# Patient Record
Sex: MALE | Race: WHITE | NOT HISPANIC OR LATINO | Employment: OTHER | URBAN - METROPOLITAN AREA
[De-identification: names, ages, dates, MRNs, and addresses within clinical notes are randomized per-mention and may not be internally consistent; named-entity substitution may affect disease eponyms.]

---

## 2019-03-03 ENCOUNTER — HOSPITAL ENCOUNTER (OUTPATIENT)
Dept: ADMINISTRATIVE | Facility: HOSPITAL | Age: 64
End: 2019-03-04
Attending: INTERNAL MEDICINE | Admitting: INTERNAL MEDICINE

## 2019-03-03 LAB
ABS NEUT (OLG): 6.42 X10(3)/MCL (ref 2.1–9.2)
ALBUMIN SERPL-MCNC: 3.5 GM/DL (ref 3.4–5)
ALBUMIN/GLOB SERPL: 0.9 {RATIO}
ALP SERPL-CCNC: 117 UNIT/L (ref 45–117)
ALT SERPL-CCNC: 18 UNIT/L (ref 16–61)
AST SERPL-CCNC: 21 UNIT/L (ref 15–37)
BASOPHILS # BLD AUTO: 0.03 X10(3)/MCL (ref 0–0.2)
BASOPHILS NFR BLD AUTO: 0.3 % (ref 0–0.9)
BILIRUB SERPL-MCNC: 0.3 MG/DL (ref 0.2–1)
BILIRUBIN DIRECT+TOT PNL SERPL-MCNC: <0.1 MG/DL (ref 0–0.2)
BILIRUBIN DIRECT+TOT PNL SERPL-MCNC: >0.2 MG/DL (ref 0–1)
BUN SERPL-MCNC: 10 MG/DL (ref 7–18)
CALCIUM SERPL-MCNC: 7.9 MG/DL (ref 8.5–10.1)
CHLORIDE SERPL-SCNC: 104 MMOL/L (ref 98–107)
CK MB SERPL-MCNC: 3.3 NG/ML (ref 0.5–3.6)
CO2 SERPL-SCNC: 24 MMOL/L (ref 21–32)
CREAT SERPL-MCNC: 0.93 MG/DL (ref 0.7–1.3)
D DIMER PPP IA.FEU-MCNC: <270 NG/ML FEU (ref 0–500)
EOSINOPHIL # BLD AUTO: 0.17 X10(3)/MCL (ref 0–0.9)
EOSINOPHIL NFR BLD AUTO: 1.9 % (ref 0–6.5)
ERYTHROCYTE [DISTWIDTH] IN BLOOD BY AUTOMATED COUNT: 13.2 % (ref 11.5–17)
GLOBULIN SER-MCNC: 4 GM/DL (ref 2–4)
GLUCOSE SERPL-MCNC: 328 MG/DL (ref 74–106)
HCT VFR BLD AUTO: 41.5 % (ref 42–52)
HGB BLD-MCNC: 13.8 GM/DL (ref 14–18)
IMM GRANULOCYTES # BLD AUTO: 0.04 10*3/UL (ref 0–0.02)
IMM GRANULOCYTES NFR BLD AUTO: 0.4 % (ref 0–0.43)
LYMPHOCYTES # BLD AUTO: 1.83 X10(3)/MCL (ref 0.6–4.6)
LYMPHOCYTES NFR BLD AUTO: 20.4 % (ref 16.2–38.3)
MCH RBC QN AUTO: 28.8 PG (ref 27–31)
MCHC RBC AUTO-ENTMCNC: 33.3 GM/DL (ref 33–36)
MCV RBC AUTO: 86.6 FL (ref 80–94)
MONOCYTES # BLD AUTO: 0.49 X10(3)/MCL (ref 0.1–1.3)
MONOCYTES NFR BLD AUTO: 5.5 % (ref 4.7–11.3)
NEUTROPHILS # BLD AUTO: 6.42 X10(3)/MCL (ref 2.1–9.2)
NEUTROPHILS NFR BLD AUTO: 71.5 % (ref 49.1–73.4)
NRBC BLD AUTO-RTO: 0 % (ref 0–0.2)
PLATELET # BLD AUTO: 212 X10(3)/MCL (ref 130–400)
PMV BLD AUTO: 10.8 FL (ref 7.4–10.4)
POTASSIUM SERPL-SCNC: 4.3 MMOL/L (ref 3.5–5.1)
PROT SERPL-MCNC: 7.4 GM/DL (ref 6.4–8.2)
RBC # BLD AUTO: 4.79 X10(6)/MCL (ref 4.7–6.1)
SODIUM SERPL-SCNC: 134 MMOL/L (ref 136–145)
TROPONIN I SERPL-MCNC: 0.03 NG/ML (ref 0–0.04)
TROPONIN I SERPL-MCNC: 0.08 NG/ML (ref 0.02–0.49)
WBC # SPEC AUTO: 9 X10(3)/MCL (ref 4.5–11.5)

## 2019-03-04 LAB
CK MB SERPL-MCNC: 2.5 NG/ML (ref 0.5–3.6)
CK MB SERPL-MCNC: 3 NG/ML (ref 0.5–3.6)
TROPONIN I SERPL-MCNC: 0.08 NG/ML (ref 0.02–0.49)
TROPONIN I SERPL-MCNC: 0.08 NG/ML (ref 0.02–0.49)

## 2019-08-09 ENCOUNTER — HISTORICAL (OUTPATIENT)
Dept: CARDIOLOGY | Facility: HOSPITAL | Age: 64
End: 2019-08-09

## 2019-11-18 ENCOUNTER — HISTORICAL (OUTPATIENT)
Dept: ADMINISTRATIVE | Facility: HOSPITAL | Age: 64
End: 2019-11-18

## 2021-01-15 ENCOUNTER — HISTORICAL (OUTPATIENT)
Dept: LAB | Facility: HOSPITAL | Age: 66
End: 2021-01-15

## 2021-01-15 LAB
ABS NEUT (OLG): 7.01 X10(3)/MCL (ref 2.1–9.2)
BASOPHILS # BLD AUTO: 0.03 X10(3)/MCL (ref 0–0.2)
BASOPHILS NFR BLD AUTO: 0.3 % (ref 0–0.9)
EOSINOPHIL # BLD AUTO: 0.2 X10(3)/MCL (ref 0–0.9)
EOSINOPHIL NFR BLD AUTO: 2 % (ref 0–6.5)
ERYTHROCYTE [DISTWIDTH] IN BLOOD BY AUTOMATED COUNT: 15.1 % (ref 11.5–17)
HCT VFR BLD AUTO: 38.9 % (ref 42–52)
HGB BLD-MCNC: 12.3 GM/DL (ref 14–18)
IMM GRANULOCYTES # BLD AUTO: 0.05 10*3/UL (ref 0–0.02)
IMM GRANULOCYTES NFR BLD AUTO: 0.5 % (ref 0–0.43)
LYMPHOCYTES # BLD AUTO: 2.14 X10(3)/MCL (ref 0.6–4.6)
LYMPHOCYTES NFR BLD AUTO: 21.2 % (ref 16.2–38.3)
MCH RBC QN AUTO: 28.4 PG (ref 27–31)
MCHC RBC AUTO-ENTMCNC: 31.6 GM/DL (ref 33–36)
MCV RBC AUTO: 89.8 FL (ref 80–94)
MONOCYTES # BLD AUTO: 0.67 X10(3)/MCL (ref 0.1–1.3)
MONOCYTES NFR BLD AUTO: 6.6 % (ref 4.7–11.3)
NEUTROPHILS # BLD AUTO: 7.01 X10(3)/MCL (ref 2.1–9.2)
NEUTROPHILS NFR BLD AUTO: 69.4 % (ref 49.1–73.4)
NRBC BLD AUTO-RTO: 0 % (ref 0–0.2)
PLATELET # BLD AUTO: 245 X10(3)/MCL (ref 130–400)
PMV BLD AUTO: 10.8 FL (ref 7.4–10.4)
RBC # BLD AUTO: 4.33 X10(6)/MCL (ref 4.7–6.1)
WBC # SPEC AUTO: 10.1 X10(3)/MCL (ref 4.5–11.5)

## 2021-01-19 ENCOUNTER — HISTORICAL (OUTPATIENT)
Dept: ENDOSCOPY | Facility: HOSPITAL | Age: 66
End: 2021-01-19

## 2022-04-07 ENCOUNTER — HISTORICAL (OUTPATIENT)
Dept: ADMINISTRATIVE | Facility: HOSPITAL | Age: 67
End: 2022-04-07

## 2022-04-24 VITALS — SYSTOLIC BLOOD PRESSURE: 122 MMHG | DIASTOLIC BLOOD PRESSURE: 66 MMHG

## 2022-04-28 NOTE — ED PROVIDER NOTES
Patient:   Terrence Woods            MRN: [id number]            FIN: [id number]               Age:   63 years     Sex:  Male     :  1955   Associated Diagnoses:   Acute chest pain; New onset atrial flutter; Elevated troponin   Author:   Anayeli Hadley MD      Basic Information   History source: Patient.   Arrival mode: Private vehicle.   History limitation: None.      History of Present Illness   The patient presents with chest pain and Mr. Woods is a 63-year-old obese male with a history of hypertension, diabetes, who states he developed chest pain last night to the left chest radiating to his left shoulder and left upper back, exacerbated by lying flat, improved by sitting up and being still.  He has no shortness of breath, diaphoresis, palpitations.  He states he had an angiogram at our [facility] 3 years ago and was told he did not have blockages.  He has no history of irregular heart rhythm and is not on blood thinners..  The onset was 1  days ago.  The course/duration of symptoms is fluctuating in intensity.  Location: Left substernal chest. Radiating pain: left shoulder.  left side of the back.  upper back. The character of symptoms is achy.  The degree at onset was moderate.  The degree at maximum was moderate.  The degree at present is none.  There are exacerbating factors including exertion, movement and breathing.  The relieving factor is rest.  Risk factors consist of coronary artery disease, hypertension and diabetes mellitus.  Prior episodes: cardiac.  Therapy today None.  Associated symptoms: denies shortness of breath, denies nausea, denies vomiting and denies diaphoresis.        Review of Systems   Cardiovascular symptoms:  Chest pain.             Additional review of systems information: All other systems reviewed and otherwise negative.      Health Status   Allergies:    Allergic Reactions (Selected)  No Known Allergies,    Allergies (1) Active Reaction  No Known  Allergies None Documented.   Medications:  (Selected)   Prescriptions  Prescribed  Actos 30 mg oral tablet: 30 mg = 1 tab(s), Oral, Daily, # 30 tab(s), 3 Refill(s), Pharmacy: [facility]/pharmacy #[id number]  Ranexa 500mg Tab extended release: 500 mg = 1 tab(s), Oral, BID, # 60 tab(s), 6 Refill(s), [id number]  esomeprazole 40 mg oral DR capsule (pt. own): 40 mg = 1 cap(s), Oral, Daily, # 30 cap(s), 3 Refill(s), Pharmacy: [facility]/pharmacy #[id number]  gabapentin 300 mg oral capsule: 300 mg = 1 cap(s), Oral, Once a day (at bedtime), # 30 cap(s), 3 Refill(s), Pharmacy: [facility]/pharmacy #[id number]  glimepiride 4 mg oral tablet: See Instructions, TAKE 1 TABLET BY MOUTH EVERY DAY, # 30 tab(s), 6 Refill(s), [id number]  hydrochlorothiazide 25 mg oral tablet: 25 mg = 1 tab(s), Oral, Daily, # 30 tab(s), 6 Refill(s), Pharmacy: [facility]/pharmacy #[id number]  metformin 500 mg oral tablet: See Instructions, TAKE 2 TABLETS BY MOUTH TWICE A DAY, # 120 tab(s), 6 Refill(s), Pharmacy: [facility]/pharmacy #[id number]  metoprolol tartrate 25 mg oral tablet: 25 mg = 1 tab(s), Oral, BID, # 60 tab(s), 6 Refill(s), [id number].      Past Medical/ Family/ Social History   Medical history: Negative.   Surgical history:    Shoulder repair ([id number])., Reviewed as documented in chart.   Family history:    Diabetes mellitus type 2  Mother  Heart disease  Mother  Congestive heart disease.  Father  , Reviewed as documented in chart.   Social history:    Social & Psychosocial Habits    Alcohol  04/25/2016  Use: Past    Employment/School  04/25/2016  Status: Unemployed    Home/Environment  04/25/2016  Lives with: Children    Nutrition/Health  04/25/2016  Type of diet: Calorie restricted    Substance Abuse  04/25/2016  Use: Never    Tobacco  03/03/2019  Use: Never (less than 100 in l    Type: Cigarettes    Patient Wants Consult For Cessation Counseling N/A    Ready to change: No, Tobacco use: Regularly.   Problem list:    Active Problems (2)  DM  (diabetes mellitus)   Tobacco user   HTN  CAD, no stents, per nurse's notes.      Physical Examination               Vital Signs      Vital Signs (last 24 hrs)_____  Last Charted___________  Temp Oral     37 DegC  (MAR 03 09:53)  Heart Rate Peripheral   H 109bpm  (MAR 03 09:53)  Resp Rate         18 br/min  (MAR 03 09:53)  SBP      H 175mmHg  (MAR 03 11:33)  DBP      H 94mmHg  (MAR 03 11:33)  SpO2      99 %  (MAR 03 09:53).   Oxygen saturation.   General:  Alert, no acute distress.    Skin:  Warm, dry, pink, intact.    Eye:  Pupils are equal, round and reactive to light.   Neck:  Supple, no JVD.    Cardiovascular:  Regular rate and rhythm.   Respiratory:  Lungs are clear to auscultation, respirations are non-labored.    Gastrointestinal:  Soft, Nontender.    Musculoskeletal:  Normal ROM, normal strength, no swelling, no deformity, No sign of DVT, 1+ pitting edema to lower extremities.    Neurological:  Alert and oriented to person, place, time, and situation.   Psychiatric:  Cooperative.      Medical Decision Making   Differential Diagnosis:  Unstable angina, anxiety, pulmonary embolism, Arrhythmia.    Documents reviewed:  Emergency department nurses' notes, On chart review and noted that he had a cardiac angiogram performed by Dr. Bhandari on July 1, 2016 which showed a 30-40% stenosis of the LAD and 30-40% stenosis of the right coronary.    Orders  Launch Orders   Laboratory:  D-Dimer (Order): Stat collect, 3/3/2019 9:58 CST, Blood, Lab Collect, 3/3/2019 9:58 CST  BNP-Pro (Order): Stat collect, 3/3/2019 9:58 CST, Blood, Lab Collect, 3/3/2019 9:58 CST  Troponin-I (Order): Stat collect, 3/3/2019 9:58 CST, Blood, Lab Collect, 3/3/2019 9:58 CST  CMP (Order): Stat collect, 3/3/2019 9:58 CST, Blood, Lab Collect, 3/3/2019 9:58 CST  CBC w/ Auto Diff (Order): Now collect, 3/3/2019 9:58 CST, Blood, Lab Collect, 3/3/2019 9:58 CST  Patient Care:  Saline Lock Insert (Order): 3/3/2019 9:58 CST  Cardiac Monitoring (Order):  3/3/2019 9:58 CST, Constant Order  Radiology:  XR Chest 1 View (Order): Stat, 3/3/2019 9:58 CST, Chest Pain, None, Patient Bed, Patient Has IV?, Rad Type, Not Scheduled  Cardiology:  EKG (Order): 3/3/2019 9:58 CST, Stat, Once, 3/3/2019 9:58 CST, Patient Bed, Patient Has IV, Standard Precautions, -1, -1, 3/3/2019 9:58 CST.   Electrocardiogram:  Time 3/3/2019 09:53:00, rate 108, A flutter, low voltage QRS, nonspecific ST changes, normal T waves, abnormal EKG.    Results review:  Lab results : Lab View   3/3/2019 11:23 CST       WBC                       9.0 x10(3)/mcL                             RBC                       4.79 x10(6)/mcL                             Hgb                       13.8 gm/dL  LOW                             Hct                       41.5 %  LOW                             Platelet                  212 x10(3)/mcL                             MCV                       86.6 fL                             MCH                       28.8 pg                             MCHC                      33.3 gm/dL                             RDW                       13.2 %                             MPV                       10.8 fL  HI                             Abs Neut                  6.42 x10(3)/mcL                             Neutro Auto               71.5 %                             Lymph Auto                20.4 %                             Mono Auto                 5.5 %                             Eos Auto                  1.9 %                             Abs Eos                   0.17 x10(3)/mcL                             Basophil Auto             0.3 %                             Abs Neutro                6.42 x10(3)/mcL                             Abs Lymph                 1.83 x10(3)/mcL                             Abs Mono                  0.49 x10(3)/mcL                             Abs Baso                  0.03 x10(3)/mcL                             NRBC%                     0.0 %                              IG%                       0.400 %                             IG#                       0.0400  HI    3/3/2019 10:20 CST       Sodium Lvl                134 mmol/L  LOW                             Potassium Lvl             4.3 mmol/L                             Chloride                  104 mmol/L                             CO2                       24.0 mmol/L                             Calcium Lvl               7.9 mg/dL  LOW                             Glucose Lvl               328 mg/dL  HI                             BUN                       10.0 mg/dL                             Creatinine                0.93 mg/dL                             eGFR-AA                   >60 mL/min/1.73 m2  NA                             eGFR-TAWANNA                  >60 mL/min/1.73 m2  NA                             Bili Total                0.3 mg/dL                             Bili Direct               <0.10 mg/dL                             Bili Indirect             >0.20 mg/dL                             AST                       21 unit/L                             ALT                       18 unit/L                             Alk Phos                  117 unit/L                             Total Protein             7.4 gm/dL                             Albumin Lvl               3.5 gm/dL                             Globulin                  4 gm/dL                             A/G Ratio                 0.9  NA                             NT pro BNP.               402 pg/mL  HI                             Troponin-I                0.031 ng/mL  ,    No qualifying data available.    Radiology results:  Rad Results (ST)  < 12 hrs   Accession: [id number]  Order: XR Chest 1 View  Report Dt/Tm: 03/03/2019 11:17  Report:   one view of the chest     CPT 93679     HISTORY:  Chest Pain     FINDINGS:  Examination reveals mediastinal and cardiac silhouettes to be within  normal limits. Lung fields are clear  and free of gross infiltrates  atelectases or effusions     IMPRESSION: No active pulmonary disease    .      Reexamination/ Reevaluation   Time: 3/3/2019 11:48:00 .   Notes: No chest pain at this time, vital signs stable.      Impression and Plan   Diagnosis   Acute chest pain (POJ73-TT R07.9)   New onset atrial flutter (CPG82-KD I48.92)   Elevated troponin (UEX94-VR R74.8)      Calls-Consults   -  3/3/2019 12:01:00 , Isaiah COLON, Ron RM, Cardiology, Case discussed with Ronny nurse practitioner.  Atrial flutter is known although the the patient denied ever having an abnormal heart rhythm.  Patient denied ever being on blood thinners, but according to Ronny's records he was previously on Eliquis for this problem.  Troponin is mildly elevated, BNP mildly elevated.  Patient has chest pain with known CAD so we will place him in the hospital for observation and further evaluation..    Plan   Condition: Stable.    Disposition: Admit time  3/3/2019 12:03:00, Place in Observation Telemetry Unit.    Counseled: Patient, Regarding diagnosis, Regarding diagnostic results, Regarding treatment plan, Patient indicated understanding of instructions, Patient is agreeable to hospital admission.

## 2022-04-28 NOTE — CONSULTS
DATE OF CONSULTATION:  08/09/2019    ATTENDING PHYSICIAN:  Frank Bean MD  CONSULTING PHYSICIAN:  Clyde Quezada IV, MD    CHIEF COMPLAINT:  Chest pain.    HISTORY OF PRESENT ILLNESS:  The patient is a 63-year-old male with a long history of diabetes who has been having some dyspnea on exertion and chest pain over the last month or so.  He also has a history of atrial fibrillation.  He was admitted for elective left heart cath which revealed severe multivessel coronary artery disease, including an occluded LAD with some filling retrograde disease of the ramus, OM, and the posterior descending artery.  Has good LV function by echo.  Interestingly, his cath 3 years ago revealed only mild disease but the patient lost his insurance at that time and was not able to afford his diabetes medicines and undoubtedly his diabetes has been out of control for that period.    PAST MEDICAL HISTORY:  Significant for diabetes mellitus, A-flutter, arthralgia of the back, obesity.    PAST SURGICAL HISTORY:  Significant for shoulder surgery, left heart cath.    FAMILY HISTORY:  Positive for coronary artery disease and diabetes.    SOCIAL HISTORY:  He quit smoking in 1979.    REVIEW OF SYSTEMS:  Negative for fever or chills.  Positive for bilateral feet pain likely secondary to diabetic neuropathy.    PHYSICAL EXAMINATION:  GENERAL:  He is morbidly obese.  He is in no acute distress.   HEENT:  Pupils are equal and reactive to light and accommodation.  He is normocephalic and atraumatic.   NECK:  There is no jugular venous distension.   LUNGS:  Clear to auscultation bilaterally.   HEART:  Regular rate and rhythm without murmurs or gallops.   ABDOMEN:  Soft, nontender, and obese.   EXTREMITIES:  No cyanosis, clubbing, or edema.    IMPRESSION:    1. Severe multivessel coronary artery disease with good left ventricular function.  2. Diabetes mellitus.  3. Atrial flutter.  4. Morbid obesity.  5. Diabetic neuropathy.    PLAN:  The  patient will clearly benefit from coronary artery bypass grafting of the left anterior descending artery, ramus, obtuse marginal, and posterior descending artery.  In addition, we would do an ablation and ligation of his left atrial appendage and given his morbid obesity, we will also plan on rigid sternal fixation.  I have discussed the risks, benefits, and alternatives in great detail with the patient and his brother.  They understand and we are going to plan for this coming Monday.     Thank you for allowing me to participate in his care.        ______________________________  Clyde Quezada IV, MD    VET/UB  DD:  08/09/2019  Time:  11:40AM  DT:  08/09/2019  Time:  11:51AM  Job #:  [id number]

## 2022-04-28 NOTE — H&P
Patient:   Terrence Woods            MRN: [id number]            FIN: [id number]               Age:   63 years     Sex:  Male     :  1955   Associated Diagnoses:   None   Author:   Jose C Holland MD      Basic Information   Source of history:  Self, Medical record.    Referral source:  Emergency department.    History limitation:  None.       Chief Complaint   Left-sided chest pain for 1 night      History of Present Illness   Mr. Woods is a 63-year-old  man with past medical history significant for HTN, A. fib/A flutter, diabetes mellitus, and osteoarthritis of the shoulders and back who presented to the [facility] ED on 3/3/2019 with complaints of left-sided chest pain for 1 night not relieved by ibuprofen.  He reports pain started at 11 PM the previous night, sharp pain, located just under the left shoulder, with radiation to the left upper arm and back, worsened by lying flat, and better with sitting up and being still.  He took 4 tablets of ibuprofen and went to sleep, did not relieve in the morning, took 4 ibuprofen, and reported to the ED.  The pain finally stopped after about 4 hours in ED.  He was given aspirin 325, metoprolol, and nitroglycerin in the ED.  He denies itchiness of breath, diaphoresis, or palpitations.  Patient has osteoarthritis with surgery in the left shoulder, but he reports the pain is different from his usual OA pain.  Patient has a history of A. fib/A flutter and was on Eliquis was in 2016, but discontinued due to inaffordability secondary to loss of insurance.  He is currently not on any anti-coagulation or antiplatelet.  He was a serious patient and was last seen in May 2016, but has not returned because he was doing well.  Cardiac stress test was abnormal in May 2016, which led to LHC in 2016 showing 30-40% stenosis in RCA, normal left main, LAD, and LCx.  EKG in the ED showing A flutter with .  Follow-up EKG showing similar findings - atrial flutter  with heart rate in the 100 - 110.  Troponin ×3 has been negative.  NT pro-.  CXR showed no acute findings.   on sliding scale insulin.  Last A1c 11.5 in 2016.  He does not take any heart medications at home.    Allergies  NKDA    Home medications  Simvastatin 5 mg daily  Metformin 1000 mg BID  Lantus 53 units nightly  Gabapentin 300 mg TID  Omeprazole 20 mg daily    Past Medical History  HTN, A. fib/A flutter, diabetes mellitus, and osteoarthritis of the shoulders and back w    Past Surgical History  Left shoulder surgery    Family Medical History  Mother with heart disease and diabetes mellitus  Father with congestive heart failure    Social History  Alcohol use - previous user, but denies current use  Tobacco use - previous user, but denies current use  Illicit Drug use - never user       Review of Systems   Constitutional:  No fever, No chills, No sweats.    Respiratory:  No shortness of breath, No cough, No wheezing.    Cardiovascular:  No palpitations, No bradycardia, No tachycardia, No peripheral edema     Chest pain: Anterior, just under the left shoulder.    Gastrointestinal:  No nausea, No vomiting, No diarrhea, No constipation, No abdominal pain.    Musculoskeletal:  Left shoulder pain.    Neurologic:  No numbness, No tingling, No headache.       Histories   Past Medical History:    No active or resolved past medical history items have been selected or recorded.      Physical Examination      Vital Signs (last 24 hrs)_____  Last Charted___________  Temp Oral     36.7 DegC  (MAR 04 11:00)  Heart Rate Peripheral   88 bpm  (MAR 04 11:00)  Resp Rate         18 br/min  (MAR 04 11:00)  SBP      115 mmHg  (MAR 04 11:00)  DBP      73 mmHg  (MAR 04 11:00)  SpO2      97 %  (MAR 04 11:00)  Weight      140 kg  (MAR 03 21:00)  Height      173 cm  (MAR 03 21:00)  BMI      46.78  (MAR 03 21:00)   General:  Alert and oriented, No acute distress.    Eye:  Pupils are equal, round and reactive to light,  Extraocular movements are intact.    HENT:  Normocephalic, Normal hearing.    Neck:  Supple, Non-tender.    Respiratory:  Lungs are clear to auscultation, Respirations are non-labored, Breath sounds are equal, Symmetrical chest wall expansion, No chest wall tenderness.    Cardiovascular:  Normal rate, Regular rhythm, No murmur, No gallop, Good pulses equal in all extremities, 2+/3+ bilateral lower extremity edema up to 3/4 tibia.    Gastrointestinal:  Soft, Non-tender, Normal bowel sounds, Obese abdomen.    Musculoskeletal:  No tenderness, No swelling, No deformity.    Integumentary:  Warm, Dry, Intact.    Neurologic:  Alert, Oriented, No focal deficits.    Psychiatric:  Cooperative, Appropriate mood & affect.       Impression and Plan   Atypical chest pain   Hypertension  Atrial flutter  Diabetes mellitus  Osteoarthritis of shoulders and back    Patient is a 63-year-old  man with PMH significant for HTN, Atrial flutter, Diabetes mellitus, and Osteoarthritis who presented to the ED with left-sided chest pain relieved with nitroglycerin.  Currently, chest pain is no longer present, but no tingling left shoulder pain.  Patient is moving around the room and ambulating with no complaints of chest pains, palpitations, or shortness of breath.  Cardiac monitor indicates atrial flutter with heart rate 88.  Patient is asymptomatic and is hemodynamic stable overnight as well as this morning.  Okay for discharge today with close follow-up outpatient with Dr. Stevens of [facility] within 1 weeks.  Plans for further ischemic evaluation and possible cardioversion for AFlutter.  Placed on Eliquis 5 mg BID.  Continuing ASA 81 mg daily and metroprolol tartrate 25 mg BID at home.  Added lisinopril 5 mg daily.

## 2022-04-28 NOTE — DISCHARGE SUMMARY
Patient:   Terrence Woods            MRN: [id number]            FIN: [id number]               Age:   63 years     Sex:  Male     :  1955   Associated Diagnoses:   None   Author:   Weston Alanis      Please see H&P on  3.4. as same day discharge summary

## 2022-04-28 NOTE — OP NOTE
Patient:   Terrence Woods            MRN: [id number]            FIN: [id number]               Age:   65 years     Sex:  Male     :  1955   Associated Diagnoses:   None   Author:   Hillary Vernon MD      Operative Note   Operative Information   Date/ Time:  2021 08:37:00.     Procedures Performed: Colonoscopy with biopsy.     Preoperative Diagnosis: Anemia, no previous colon screening.     Postoperative Diagnosis: 1.  Scattered diverticula, right and left colon.  2.  Prominent ileocecal valve.  Biopsies were obtained.  3.  Internal hemorrhoids, grade 1-2.  Otherwise normal exam.     Surgeon: Hillary Vernon MD.     Assistant: GI staff.     Anesthesia: per anaesthesia service.     Speciman Removed: Yes.     Esimated blood loss: loss  3  cc.     Description of Procedure/Findings/    Complications: History and physical as per pre-operative note. Informed consent was obtained. Patient was placed in left lateral position. Sedation per anaesthesia service. Rectal exam was unremarkable. Olympus video colonoscope was introduced into the rectum and was carefully advanced to the cecum. The quality of the preparation was fair.  Periodic irrigation was done for better visualization. Patient tolerated the procedure well without any complications..     Findings: There were scattered diverticula noted in right as well as left colon.  Photo documentation was obtained.  There was prominent inferior lip of the ileocecal valve noted.  This appeared to be lipomatous.  Biopsies were obtained.  Terminal ileum appeared unremarkable to the extent of exam.  Cecum was unremarkable.  Transverse colon was unremarkable.  No polyps were noted to the extent of visualization.  Periodic irrigation was done throughout the procedure for better visualization.  Rectal mucosa appeared unremarkable.  There were grade 1-2 internal hemorrhoids noted on withdrawal of the scope.  Estimated withdrawal time from cecum to rectum was 15 minutes and  1 seconds..     Complications: None.     Recommendations:  1.  Follow biopsy results.  2.  High-fiber diet.  3.  Patient to follow with me as scheduled..

## 2022-04-28 NOTE — OP NOTE
Patient:   Terrence Woods            MRN: [id number]            FIN: [id number]               Age:   65 years     Sex:  Male     :  1955   Associated Diagnoses:   None   Author:   Hillary Vernon MD      Operative Note   Operative Information   Date/ Time:  2021 07:52:00.     Procedures Performed: EGD, diagnostic.     Preoperative Diagnosis: H/O GERD, anemia.     Postoperative Diagnosis: Normal EGD.     Surgeon: Hillary Vernon MD.     Assistant: GI Staff.     Anesthesia: per anaesthesia service.     Speciman Removed: None.     Esimated blood loss: No blood loss.     Description of Procedure/Findings/    Complications: History and physical as per pre-operative note. Informed consent was obtained. Patient was placed in left lateral position. Sedation per anaesthesia service. Olympus video gastroscope was introduced into the oral cavity and esophagus was intubated under direct visualization. The scope was carefully advanced to the distal duodenum. The patient tolerated the procedure well without any complications. .     Findings: Esophagus: No abnormality noted. GE junction at approximately 43 cm.  Stomach: Fundus, cardia, body and antrum appeared unremarkable.  Duodenum: Duodenal bulb, 2nd and 3rd portion of the duodenum appeared unremarkable to the extent of exam..     Complications: None.     Recommendations:  1.  Antireflux measures.  2.  H2 blockers or PPI as needed.  3.  Colonoscopy to follow.

## 2022-10-19 DIAGNOSIS — N18.2 CHRONIC KIDNEY DISEASE, STAGE II (MILD): Primary | ICD-10-CM

## 2024-02-06 ENCOUNTER — HOSPITAL ENCOUNTER (INPATIENT)
Facility: HOSPITAL | Age: 69
LOS: 2 days | Discharge: HOME | DRG: 291 | End: 2024-02-09
Attending: EMERGENCY MEDICINE | Admitting: INTERNAL MEDICINE
Payer: MEDICARE

## 2024-02-06 DIAGNOSIS — R06.02 SOB (SHORTNESS OF BREATH): ICD-10-CM

## 2024-02-06 DIAGNOSIS — N17.9 AKI (ACUTE KIDNEY INJURY): ICD-10-CM

## 2024-02-06 DIAGNOSIS — I50.9 CONGESTIVE HEART FAILURE: ICD-10-CM

## 2024-02-06 LAB
ALBUMIN SERPL-MCNC: 3.5 G/DL (ref 3.4–4.8)
ALBUMIN/GLOB SERPL: 1 RATIO (ref 1.1–2)
ALP SERPL-CCNC: 121 UNIT/L (ref 40–150)
ALT SERPL-CCNC: 23 UNIT/L (ref 0–55)
APPEARANCE UR: CLEAR
APTT PPP: 36 SECONDS (ref 23.2–33.7)
AST SERPL-CCNC: 29 UNIT/L (ref 5–34)
BACTERIA #/AREA URNS AUTO: ABNORMAL /HPF
BASOPHILS # BLD AUTO: 0.03 X10(3)/MCL
BASOPHILS NFR BLD AUTO: 0.3 %
BILIRUB SERPL-MCNC: 0.5 MG/DL
BILIRUB UR QL STRIP.AUTO: NEGATIVE
BNP BLD-MCNC: 234.9 PG/ML
BUN SERPL-MCNC: 35.9 MG/DL (ref 8.4–25.7)
CALCIUM SERPL-MCNC: 9.3 MG/DL (ref 8.8–10)
CHLORIDE SERPL-SCNC: 111 MMOL/L (ref 98–107)
CO2 SERPL-SCNC: 21 MMOL/L (ref 23–31)
COLOR UR AUTO: ABNORMAL
CREAT SERPL-MCNC: 1.46 MG/DL (ref 0.73–1.18)
CREAT UR-MCNC: 19.5 MG/DL (ref 63–166)
CREAT UR-MCNC: 20.4 MG/DL (ref 63–166)
EOSINOPHIL # BLD AUTO: 0.26 X10(3)/MCL (ref 0–0.9)
EOSINOPHIL NFR BLD AUTO: 2.5 %
ERYTHROCYTE [DISTWIDTH] IN BLOOD BY AUTOMATED COUNT: 15.2 % (ref 11.5–17)
GFR SERPLBLD CREATININE-BSD FMLA CKD-EPI: 52 MLS/MIN/1.73/M2
GLOBULIN SER-MCNC: 3.5 GM/DL (ref 2.4–3.5)
GLUCOSE SERPL-MCNC: 86 MG/DL (ref 82–115)
GLUCOSE UR QL STRIP.AUTO: NORMAL
HCT VFR BLD AUTO: 34.9 % (ref 42–52)
HGB BLD-MCNC: 11.3 G/DL (ref 14–18)
IMM GRANULOCYTES # BLD AUTO: 0.04 X10(3)/MCL (ref 0–0.04)
IMM GRANULOCYTES NFR BLD AUTO: 0.4 %
INR PPP: 1.3
KETONES UR QL STRIP.AUTO: NEGATIVE
LEUKOCYTE ESTERASE UR QL STRIP.AUTO: NEGATIVE
LYMPHOCYTES # BLD AUTO: 1.62 X10(3)/MCL (ref 0.6–4.6)
LYMPHOCYTES NFR BLD AUTO: 15.7 %
MCH RBC QN AUTO: 30.3 PG (ref 27–31)
MCHC RBC AUTO-ENTMCNC: 32.4 G/DL (ref 33–36)
MCV RBC AUTO: 93.6 FL (ref 80–94)
MONOCYTES # BLD AUTO: 0.55 X10(3)/MCL (ref 0.1–1.3)
MONOCYTES NFR BLD AUTO: 5.3 %
MUCOUS THREADS URNS QL MICRO: ABNORMAL /LPF
NEUTROPHILS # BLD AUTO: 7.8 X10(3)/MCL (ref 2.1–9.2)
NEUTROPHILS NFR BLD AUTO: 75.8 %
NITRITE UR QL STRIP.AUTO: NEGATIVE
NRBC BLD AUTO-RTO: 0 %
OSMOLALITY UR: 370 MOSM/KG (ref 300–1300)
PH UR STRIP.AUTO: 5.5 [PH]
PLATELET # BLD AUTO: 75 X10(3)/MCL (ref 130–400)
PMV BLD AUTO: 12.7 FL (ref 7.4–10.4)
POTASSIUM SERPL-SCNC: 5.5 MMOL/L (ref 3.5–5.1)
PROT SERPL-MCNC: 7 GM/DL (ref 5.8–7.6)
PROT UR QL STRIP.AUTO: NEGATIVE
PROT UR STRIP-MCNC: <6.8 MG/DL
PROTHROMBIN TIME: 16 SECONDS (ref 12.5–14.5)
RBC # BLD AUTO: 3.73 X10(6)/MCL (ref 4.7–6.1)
RBC #/AREA URNS AUTO: ABNORMAL /HPF
RBC UR QL AUTO: NEGATIVE
SODIUM SERPL-SCNC: 142 MMOL/L (ref 136–145)
SODIUM UR-SCNC: 125 MMOL/L
SP GR UR STRIP.AUTO: 1.02 (ref 1–1.03)
SQUAMOUS #/AREA URNS LPF: ABNORMAL /HPF
TROPONIN I SERPL-MCNC: 0.02 NG/ML (ref 0–0.04)
UROBILINOGEN UR STRIP-ACNC: 2
UUN UR-MCNC: 177 MG/DL
WBC # SPEC AUTO: 10.3 X10(3)/MCL (ref 4.5–11.5)
WBC #/AREA URNS AUTO: ABNORMAL /HPF

## 2024-02-06 PROCEDURE — 81001 URINALYSIS AUTO W/SCOPE: CPT

## 2024-02-06 PROCEDURE — 25000003 PHARM REV CODE 250: Performed by: INTERNAL MEDICINE

## 2024-02-06 PROCEDURE — 96372 THER/PROPH/DIAG INJ SC/IM: CPT | Performed by: INTERNAL MEDICINE

## 2024-02-06 PROCEDURE — 84520 ASSAY OF UREA NITROGEN: CPT | Performed by: NURSE PRACTITIONER

## 2024-02-06 PROCEDURE — 93005 ELECTROCARDIOGRAM TRACING: CPT

## 2024-02-06 PROCEDURE — 84484 ASSAY OF TROPONIN QUANT: CPT

## 2024-02-06 PROCEDURE — 83880 ASSAY OF NATRIURETIC PEPTIDE: CPT

## 2024-02-06 PROCEDURE — 63600175 PHARM REV CODE 636 W HCPCS: Performed by: INTERNAL MEDICINE

## 2024-02-06 PROCEDURE — 84300 ASSAY OF URINE SODIUM: CPT | Performed by: NURSE PRACTITIONER

## 2024-02-06 PROCEDURE — 82570 ASSAY OF URINE CREATININE: CPT | Performed by: NURSE PRACTITIONER

## 2024-02-06 PROCEDURE — 99285 EMERGENCY DEPT VISIT HI MDM: CPT | Mod: 25

## 2024-02-06 PROCEDURE — 96376 TX/PRO/DX INJ SAME DRUG ADON: CPT

## 2024-02-06 PROCEDURE — 85610 PROTHROMBIN TIME: CPT

## 2024-02-06 PROCEDURE — G0378 HOSPITAL OBSERVATION PER HR: HCPCS

## 2024-02-06 PROCEDURE — 80053 COMPREHEN METABOLIC PANEL: CPT

## 2024-02-06 PROCEDURE — 83935 ASSAY OF URINE OSMOLALITY: CPT | Performed by: NURSE PRACTITIONER

## 2024-02-06 PROCEDURE — 96374 THER/PROPH/DIAG INJ IV PUSH: CPT

## 2024-02-06 PROCEDURE — 82962 GLUCOSE BLOOD TEST: CPT

## 2024-02-06 PROCEDURE — 63600175 PHARM REV CODE 636 W HCPCS: Performed by: NURSE PRACTITIONER

## 2024-02-06 PROCEDURE — 82570 ASSAY OF URINE CREATININE: CPT | Mod: 91 | Performed by: NURSE PRACTITIONER

## 2024-02-06 PROCEDURE — 85730 THROMBOPLASTIN TIME PARTIAL: CPT

## 2024-02-06 PROCEDURE — 85025 COMPLETE CBC W/AUTO DIFF WBC: CPT

## 2024-02-06 PROCEDURE — 93010 ELECTROCARDIOGRAM REPORT: CPT | Mod: ,,, | Performed by: INTERNAL MEDICINE

## 2024-02-06 PROCEDURE — 63600175 PHARM REV CODE 636 W HCPCS: Performed by: EMERGENCY MEDICINE

## 2024-02-06 RX ORDER — METFORMIN HYDROCHLORIDE 1000 MG/1
1000 TABLET ORAL 2 TIMES DAILY WITH MEALS
COMMUNITY
Start: 2023-10-18

## 2024-02-06 RX ORDER — LISINOPRIL 5 MG/1
5 TABLET ORAL DAILY
Status: ON HOLD | COMMUNITY
Start: 2024-01-06 | End: 2024-02-09 | Stop reason: HOSPADM

## 2024-02-06 RX ORDER — FERROUS SULFATE 325(65) MG
325 TABLET, DELAYED RELEASE (ENTERIC COATED) ORAL DAILY
COMMUNITY
Start: 2023-10-18

## 2024-02-06 RX ORDER — IBUPROFEN 200 MG
24 TABLET ORAL
Status: DISCONTINUED | OUTPATIENT
Start: 2024-02-06 | End: 2024-02-09 | Stop reason: HOSPADM

## 2024-02-06 RX ORDER — NITROGLYCERIN 0.4 MG/1
1 TABLET SUBLINGUAL EVERY 5 MIN PRN
COMMUNITY

## 2024-02-06 RX ORDER — GLUCAGON 1 MG
1 KIT INJECTION
Status: DISCONTINUED | OUTPATIENT
Start: 2024-02-06 | End: 2024-02-09 | Stop reason: HOSPADM

## 2024-02-06 RX ORDER — METOPROLOL TARTRATE 25 MG/1
25 TABLET, FILM COATED ORAL 2 TIMES DAILY
Status: DISCONTINUED | OUTPATIENT
Start: 2024-02-06 | End: 2024-02-09 | Stop reason: HOSPADM

## 2024-02-06 RX ORDER — ACETAMINOPHEN 325 MG/1
650 TABLET ORAL EVERY 8 HOURS PRN
Status: DISCONTINUED | OUTPATIENT
Start: 2024-02-06 | End: 2024-02-09 | Stop reason: HOSPADM

## 2024-02-06 RX ORDER — INSULIN GLARGINE 100 [IU]/ML
36 INJECTION, SOLUTION SUBCUTANEOUS DAILY
COMMUNITY
Start: 2023-10-18

## 2024-02-06 RX ORDER — METOPROLOL TARTRATE 25 MG/1
25 TABLET, FILM COATED ORAL 2 TIMES DAILY
COMMUNITY

## 2024-02-06 RX ORDER — PANTOPRAZOLE SODIUM 40 MG/1
40 TABLET, DELAYED RELEASE ORAL DAILY
Status: DISCONTINUED | OUTPATIENT
Start: 2024-02-07 | End: 2024-02-09 | Stop reason: HOSPADM

## 2024-02-06 RX ORDER — NITROGLYCERIN 0.4 MG/1
0.4 TABLET SUBLINGUAL EVERY 5 MIN PRN
Status: DISCONTINUED | OUTPATIENT
Start: 2024-02-06 | End: 2024-02-09 | Stop reason: HOSPADM

## 2024-02-06 RX ORDER — ATORVASTATIN CALCIUM 40 MG/1
40 TABLET, FILM COATED ORAL NIGHTLY
Status: DISCONTINUED | OUTPATIENT
Start: 2024-02-06 | End: 2024-02-09 | Stop reason: HOSPADM

## 2024-02-06 RX ORDER — GABAPENTIN 600 MG/1
600 TABLET ORAL 3 TIMES DAILY
Status: ON HOLD | COMMUNITY
Start: 2023-07-25 | End: 2024-02-09

## 2024-02-06 RX ORDER — FUROSEMIDE 10 MG/ML
40 INJECTION INTRAMUSCULAR; INTRAVENOUS
Status: COMPLETED | OUTPATIENT
Start: 2024-02-06 | End: 2024-02-06

## 2024-02-06 RX ORDER — ONDANSETRON HYDROCHLORIDE 2 MG/ML
4 INJECTION, SOLUTION INTRAVENOUS EVERY 8 HOURS PRN
Status: DISCONTINUED | OUTPATIENT
Start: 2024-02-06 | End: 2024-02-09 | Stop reason: HOSPADM

## 2024-02-06 RX ORDER — OMEPRAZOLE 20 MG/1
20 CAPSULE, DELAYED RELEASE ORAL DAILY
COMMUNITY
Start: 2024-01-22

## 2024-02-06 RX ORDER — IBUPROFEN 200 MG
16 TABLET ORAL
Status: DISCONTINUED | OUTPATIENT
Start: 2024-02-06 | End: 2024-02-09 | Stop reason: HOSPADM

## 2024-02-06 RX ORDER — FUROSEMIDE 10 MG/ML
40 INJECTION INTRAMUSCULAR; INTRAVENOUS EVERY 12 HOURS
Status: DISCONTINUED | OUTPATIENT
Start: 2024-02-06 | End: 2024-02-08

## 2024-02-06 RX ORDER — ACETAMINOPHEN 325 MG/1
650 TABLET ORAL EVERY 4 HOURS PRN
Status: DISCONTINUED | OUTPATIENT
Start: 2024-02-06 | End: 2024-02-09 | Stop reason: HOSPADM

## 2024-02-06 RX ORDER — CHLORTHALIDONE 25 MG/1
TABLET ORAL
Status: ON HOLD | COMMUNITY
Start: 2023-10-18 | End: 2024-02-09 | Stop reason: HOSPADM

## 2024-02-06 RX ORDER — MELOXICAM 15 MG/1
15 TABLET ORAL DAILY
Status: ON HOLD | COMMUNITY
Start: 2024-01-11 | End: 2024-02-09 | Stop reason: HOSPADM

## 2024-02-06 RX ORDER — SIMVASTATIN 5 MG/1
5 TABLET, FILM COATED ORAL NIGHTLY
COMMUNITY
Start: 2023-10-18

## 2024-02-06 RX ORDER — SODIUM BICARBONATE 650 MG/1
650 TABLET ORAL 2 TIMES DAILY
Status: DISCONTINUED | OUTPATIENT
Start: 2024-02-06 | End: 2024-02-07

## 2024-02-06 RX ORDER — INSULIN ASPART 100 [IU]/ML
1-10 INJECTION, SOLUTION INTRAVENOUS; SUBCUTANEOUS
Status: DISCONTINUED | OUTPATIENT
Start: 2024-02-06 | End: 2024-02-09 | Stop reason: HOSPADM

## 2024-02-06 RX ADMIN — FUROSEMIDE 40 MG: 10 INJECTION, SOLUTION INTRAMUSCULAR; INTRAVENOUS at 08:02

## 2024-02-06 RX ADMIN — ATORVASTATIN CALCIUM 40 MG: 40 TABLET, FILM COATED ORAL at 08:02

## 2024-02-06 RX ADMIN — METOPROLOL TARTRATE 25 MG: 25 TABLET, FILM COATED ORAL at 08:02

## 2024-02-06 RX ADMIN — SODIUM BICARBONATE 650 MG TABLET 650 MG: at 08:02

## 2024-02-06 RX ADMIN — FUROSEMIDE 40 MG: 10 INJECTION, SOLUTION INTRAMUSCULAR; INTRAVENOUS at 03:02

## 2024-02-06 NOTE — H&P
[facility]  Hospital Medicine History & Physical Examination       Patient Name: Terrence Woods  MRN: [id number]  Patient Class: OP- Observation   Admission Date: 02/06/2024   Admitting Service: Hospital Medicine   Length of Stay: 0  Attending Physician: Aman Lr MD  Primary Care Provider: Anjali Craven NP  Face-to-Face encounter date: 02/06/2024  Code Status: Full  Chief Complaint: Shortness of Breath (To ED with SOB on exertion, fluid retention, abd edema and bilat LE edema. Advised to come to ED by PCP for 27 lbs weight gain in 1 month. Sees Dr Bean. Hx CABG, CKD. )      Patient information was obtained from patient, patient's family, past medical records and ER records.      HISTORY OF PRESENT ILLNESS:   Terrence Woods is a 68 y.o. male with a PMHx of type 2 DM, CKD stage 2, CAD s/p CABG, GERD, HTN, HLD, morbid obesity and diabetic neuropathy who presented to [facility] on 2/6/2024 at the direction of his PCP for further evaluation of bilateral lower extremity edema, abdominal swelling, fluid retention and shortness of breath upon exertion.  He also endorsed a 27 lb weight gain over the past 1 month.  Patient reports that bilateral lower extremity began after he started taking meloxicam on 01/04/2024, states he only took it about 15 days and then stopped due to the swelling.    Upon presentation to ED, vital signed included /89, HR 58, RR 24, SpO2 95% on room air, temperature 98.1° F.  Labs notable for hemoglobin 11.3, hematocrit 34.9, platelets 75, potassium 5.5, chloride 111, CO2 21, BUN 35.9, creatinine 1.46, .9.  Troponin 0.018.  Urinalysis unimpressive.  CXR negative for active pulmonary disease, cardiomegaly noted. He was given Lasix 40 mg IV in ED. Echocardiogram pending.  Admitted to hospital medicine services for further medical management.    REVIEW OF SYSTEMS:   Except as documented, all other systems reviewed and negative     PAST MEDICAL HISTORY:    Type 2 DM   CKD stage 2   CAD s/p CABG  GERD   Essential HTN  Hyperlipidemia   Morbid obesity   Diabetic neuropathy     PAST SURGICAL HISTORY:   CABG    FAMILY HISTORY:   Mother:  Diabetes  Father:  Heart disease    SOCIAL HISTORY:   Denied alcohol, tobacco or illicit drug use    ALLERGIES:   Patient has no known allergies.    HOME MEDICATIONS:     Prior to Admission medications    Not on File     ________________________________________________________________________  INPATIENT LIST OF MEDICATIONS     Current Facility-Administered Medications:     acetaminophen tablet 650 mg, 650 mg, Oral, Q8H PRN, Doumit, Marianna B, AGACNP-BC    acetaminophen tablet 650 mg, 650 mg, Oral, Q4H PRN, Doumit, Marianna B, AGACNP-BC    ondansetron injection 4 mg, 4 mg, Intravenous, Q8H PRN, Andre Rivasen B, AGACNP-BC  No current outpatient medications on file.    Scheduled Meds:  Continuous Infusions:  PRN Meds:.acetaminophen, acetaminophen, ondansetron    PHYSICAL EXAM:     VITAL SIGNS: 24 HRS MIN & MAX LAST   Temp  Min: 98.1 °F (36.7 °C)  Max: 98.1 °F (36.7 °C) 98.1 °F (36.7 °C)   BP  Min: 133/89  Max: 160/70 (!) 160/70   Pulse  Min: 52  Max: 66  (!) 52   Resp  Min: 13  Max: 24 13   SpO2  Min: 95 %  Max: 98 % 97 %       General appearance: Well-developed male in no apparent distress.  HENT: Atraumatic head. Moist mucous membranes of oral cavity.  Eyes: Normal extraocular movements.   Neck: Supple.   Lungs: Clear to auscultation bilaterally.   Heart: Regular rate and rhythm. S1 and S2 present. 2-3+ BLE edema.  Abdomen: Protuberant, edema  Extremities: No cyanosis, clubbing  Skin: No Rash.   Neuro: Motor and sensory exams grossly intact.   Psych/mental status: Appropriate mood and affect. Responds appropriately to questions.     LABS AND IMAGING:     Recent Labs   Lab 02/06/24  1417   WBC 10.30   RBC 3.73*   HGB 11.3*   HCT 34.9*   MCV 93.6   MCH 30.3   MCHC 32.4*   RDW 15.2   PLT 75*   MPV 12.7*       Recent Labs   Lab 02/06/24  4374       K 5.5*   CO2 21*   BUN 35.9*   CREATININE 1.46*   CALCIUM 9.3   ALBUMIN 3.5   ALKPHOS 121   ALT 23   AST 29   BILITOT 0.5       Microbiology Results (last 7 days)       ** No results found for the last 168 hours. **             X-Ray Chest AP Portable  Narrative: EXAMINATION:  XR CHEST AP PORTABLE    CLINICAL HISTORY:  Shortness of breath    TECHNIQUE:  Single frontal view of the chest was performed.    COMPARISON:  None    FINDINGS:  Examination reveals mediastinal silhouette to be within normal limits cardiac silhouette is enlarged some increase interstitial markings with no focal consolidative changes atelectases effusions or pneumothoraces  Impression: No active pulmonary disease.    Cardiomegaly    Electronically signed by: Jorge Sy  Date:    02/06/2024  Time:    14:19        ASSESSMENT & PLAN:       Suspected New Onset CHF - EF unknown  Fluids Volume Overload  ABDULLAHI on CKD stage 2   Thrombocytopenia   Hyperkalemia  Hyperchloremic metabolic acidosis   Type 2 DM   History of essential hypertension, hyperlipidemia, diabetic neuropathy, GERD, CAD s/p CABG    Plan:   Echocardiogram ordered in ED, pending   Cardiology consulted, appreciate recommendations   Daily weight and accurate intake and output  Lasix 40 mg IV b.i.d.  Avoid nephrotoxins   Renal US ordered  Urine studies ordered  Nephrology consulted, appreciate recommendations  Lokelma 10g PO x1  Accu-checks with ISS  Resume appropriate home medications once updated  Labs in a.m.      VTE Prophylaxis: SCDs    Discharge Planning and Disposition: TBD    I, Marianna Rivas, NP have reviewed and discussed the case with Aman Lr MD  Please see the attending MD's addendum for further assessment and plan.    Marianna Rivas, [facility]-BC  Department of Hospital Medicine   [facility]   02/06/2024    _______________________________________________________________________________  MD Addendum:    SKYLER Hartley  Be performed substantive portion of the visit, personally performed a face to face evaluation of the patient and have reviewed and agree with NP/PA documentation, treatment and plan & MDM.     67 yo  male presenting with progressively worsening pedeal edema, increased abdominal girth, a/w orthopnea, SOB on exertion. At baseline he is independent with ADLS and IADLS and reports SOB with minimal exertion lately. He was suggested to go to ER by his pcp. Borderline bradycardic and hypertensive at presentation and good saturation on room air. Blood work showing chronic anemia, low platelets, normal lFTs, mild acidosis with hyperkalemia. BNP was not impressive but could be false due to obesity and cxr showed cardiomegaly which is chronic. He doesn't smoke and denies illicit drug use. He is being admitted to  after iv lasix by ER.    When seen at bedside, he was Aox3. Lung exam reveals b/l crepitation. Hs significant pitting abd wall, pelvic all, pedal edema. No rash ws noted. He admits to using nsaids for pain.    Agree with HPI, exam, plan mentioned above. Presenting symptoms could be secondary to new onset HF given his risk factors. Nikita continue diuresis, trend trop, get TTE and involve cis for more input. Olman, acidosis could be CRS or atn, and hyperkalemia from acidosis. Besides NSAID use there is no possible etiology for thrombocytopenia jayson. Will hold aspirin and follow platelet count. Agree with urine labs and nephro eval. Revfiew other home meds. Hold gabapentin and other renal sensitive home meds. Check A1c, lipid profile as well.      SCDs    Critical care diagnosis: Acute chf needing iv diuresis  Critical care time: 50 minutes        02/06/2024

## 2024-02-06 NOTE — ED PROVIDER NOTES
Encounter Date: 2/6/2024       History     Chief Complaint   Patient presents with    Shortness of Breath     To ED with SOB on exertion, fluid retention, abd edema and bilat LE edema. Advised to come to ED by PCP for 27 lbs weight gain in 1 month. Sees Dr Bean. Hx CABG, CKD.      68-year-old male with a history of CKD presents to the emergency department for evaluation of worsening shortness of breath on exertion, orthopnea, peripheral edema and abdominal swelling.  States gained 27 lb over the last 1 month.  Followed by his primary care clinic, recently started meloxicam at which time he noted the swelling.  States he does take chlorthalidone 12.5 mg daily.  Not currently on Lasix or torsemide.  No history of congestive heart failure though does have a history of CABG    PCP: Anjali Craven NP  Cardiology: Dr. Bean    The history is provided by the patient.     Review of patient's allergies indicates:  No Known Allergies  Past Medical History:   Diagnosis Date    CKD (chronic kidney disease)      History reviewed. No pertinent surgical history.  History reviewed. No pertinent family history.  Social History     Tobacco Use    Smoking status: Never    Smokeless tobacco: Never     Review of Systems   Constitutional:  Negative for fever.   Respiratory:  Positive for shortness of breath. Negative for chest tightness.         Orthopnea   Cardiovascular:  Positive for leg swelling. Negative for chest pain.   Gastrointestinal:  Positive for abdominal distention. Negative for nausea and vomiting.       Physical Exam     Initial Vitals [02/06/24 1335]   BP Pulse Resp Temp SpO2   133/89 (!) 58 (!) 24 98.1 °F (36.7 °C) 95 %      MAP       --         Physical Exam    Nursing note and vitals reviewed.  Constitutional: He appears well-developed and well-nourished. No distress.   HENT:   Head: Normocephalic and atraumatic.   Mouth/Throat: Oropharynx is clear and moist.   Eyes: Conjunctivae are normal. No scleral icterus.   Neck:  Neck supple.   Normal range of motion.  Cardiovascular:  Normal rate and regular rhythm.           Pulmonary/Chest:   Diminished bilaterally   Abdominal: He exhibits distension. There is no abdominal tenderness. There is no rebound and no guarding.   Musculoskeletal:         General: Edema present.      Cervical back: Normal range of motion and neck supple.     Skin: Skin is warm and dry.   Venous stasis changes of bilateral lower extremities         ED Course   Procedures  Labs Reviewed   COMPREHENSIVE METABOLIC PANEL - Abnormal; Notable for the following components:       Result Value    Potassium Level 5.5 (*)     Chloride 111 (*)     Carbon Dioxide 21 (*)     Blood Urea Nitrogen 35.9 (*)     Creatinine 1.46 (*)     Albumin/Globulin Ratio 1.0 (*)     All other components within normal limits   B-TYPE NATRIURETIC PEPTIDE - Abnormal; Notable for the following components:    Natriuretic Peptide 234.9 (*)     All other components within normal limits   APTT - Abnormal; Notable for the following components:    PTT 36.0 (*)     All other components within normal limits   PROTIME-INR - Abnormal; Notable for the following components:    PT 16.0 (*)     All other components within normal limits   URINALYSIS, REFLEX TO URINE CULTURE - Abnormal; Notable for the following components:    Urobilinogen, UA 2.0 (*)     Mucous, UA Trace (*)     All other components within normal limits   CBC WITH DIFFERENTIAL - Abnormal; Notable for the following components:    RBC 3.73 (*)     Hgb 11.3 (*)     Hct 34.9 (*)     MCHC 32.4 (*)     Platelet 75 (*)     MPV 12.7 (*)     All other components within normal limits   TROPONIN I - Normal   CBC W/ AUTO DIFFERENTIAL    Narrative:     The following orders were created for panel order CBC auto differential.  Procedure                               Abnormality         Status                     ---------                               -----------         ------                     CBC with  Differential[[id number]]        Abnormal            Final result                 Please view results for these tests on the individual orders.     EKG Readings: (Independently Interpreted)   Initial Reading: No STEMI. Rhythm: Sinus Bradycardia. Heart Rate: 59. Clinical Impression: Sinus Bradycardia   02/06/2024 @ 1334  Low-voltage QRS       Imaging Results              X-Ray Chest AP Portable (Final result)  Result time 02/06/24 14:19:48      Final result by Jorge Sy MD (02/06/24 14:19:48)                   Impression:      No active pulmonary disease.    Cardiomegaly      Electronically signed by: Jorge Sy  Date:    02/06/2024  Time:    14:19               Narrative:    EXAMINATION:  XR CHEST AP PORTABLE    CLINICAL HISTORY:  Shortness of breath    TECHNIQUE:  Single frontal view of the chest was performed.    COMPARISON:  None    FINDINGS:  Examination reveals mediastinal silhouette to be within normal limits cardiac silhouette is enlarged some increase interstitial markings with no focal consolidative changes atelectases effusions or pneumothoraces                                       Medications   furosemide injection 40 mg (40 mg Intravenous Given 2/6/24 1530)     Medical Decision Making  Problems Addressed:  ABDULLAHI (acute kidney injury): acute illness or injury  Congestive heart failure: acute illness or injury  SOB (shortness of breath): acute illness or injury    Risk  Prescription drug management.      ED assessment:    Mr. Woods presents to the emergency room for evaluation of worsening shortness of breath, weight gain over the last month.  Significant peripheral edema, orthopnea, diminished breath sounds, tachypnea with any exertion.  Mildly hypertensive, no hypoxia.    Differential diagnosis (including but not limited to):   Volume overload, congestive heart failure, acute kidney injury superimposed on chronic kidney disease, peripheral edema, dependent edema, atypical acute coronary  syndrome, myocardial infarction, electrolyte derangements    ED management:   Laboratory studies with elevated BNP, negative troponin.  EKG with no overt ischemic nor arrhythmogenic changes chest x-ray with prominent interstitial changes, no focal infiltrate or effusion.  Mild acute kidney injury with creatinine 1.46 over baseline from several years ago.  Given IV Lasix and will admit for further inpatient workup for possible new diagnosis of heart failure    My independent radiology interpretation:   Chest x-ray:  Cardiomegaly, prominent interstitial changes       Amount and/or Complexity of Data Reviewed  Independent historian: none   Summary of history:   External data reviewed: notes from clinic visits  Summary of data reviewed:  History of CKD, follows outpatient, last documented with Dr. Oliva in 2022.  Risk and benefits of testing: discussed   Labs: ordered and reviewed  Radiology: ordered and independent interpretation performed (see above or ED course)  ECG/medicine tests: ordered and independent interpretation performed (see above or ED course)  Discussion of management or test interpretation with external provider(s): discussed with hospitalist physician   Summary of discussion: Discussed with JARROD Gimenez with hospitalist who accepts for admission    Risk  Prescription drug management   Decision regarding hospitalization  Shared decision making     Critical Care  none    I, Sally Green MD personally performed the history, PE, MDM, and procedures as documented above and agree with the scribe's documentation.       Clinical Impression:  Final diagnoses:  [R06.02] SOB (shortness of breath)  [I50.9] Congestive heart failure  [N17.9] ABDULLAHI (acute kidney injury)          ED Disposition Condition    Observation                 Sally Green MD  02/06/24 4913

## 2024-02-06 NOTE — FIRST PROVIDER EVALUATION
"Medical screening examination initiated.  I have conducted a focused provider triage encounter, findings are as follows:    Brief history of present illness:  68 year old male presents to ER with c/o SOB and increased swelling. Patient reports that he had increased swelling to bilateral lower extremities and had gained about 27 lbs in 1 month    Vitals:    02/06/24 1335   BP: 133/89   Pulse: (!) 58   Resp: (!) 24   Temp: 98.1 °F (36.7 °C)   TempSrc: Oral   SpO2: 95%   Weight: (!) 158.8 kg (350 lb)   Height: 5' 8" (1.727 m)       Pertinent physical exam:  Awake and alert, NAD    Brief workup plan:  Labs, CXR, EKG    Preliminary workup initiated; this workup will be continued and followed by the physician or advanced practice provider that is assigned to the patient when roomed.  "

## 2024-02-06 NOTE — Clinical Note
Diagnosis: CHF (congestive heart failure) [[id number]]   Future Attending Provider: MARY SAWYER [[id number]]   Admit to which facility:: [facility] [[id number]]

## 2024-02-07 LAB
ALBUMIN SERPL-MCNC: 3.4 G/DL (ref 3.4–4.8)
ALBUMIN/GLOB SERPL: 1.1 RATIO (ref 1.1–2)
ALP SERPL-CCNC: 110 UNIT/L (ref 40–150)
ALT SERPL-CCNC: 20 UNIT/L (ref 0–55)
AST SERPL-CCNC: 17 UNIT/L (ref 5–34)
AV INDEX (PROSTH): 0.71
AV MEAN GRADIENT: 3 MMHG
AV PEAK GRADIENT: 5 MMHG
AV VALVE AREA BY VELOCITY RATIO: 2.45 CM²
AV VALVE AREA: 2.22 CM²
AV VELOCITY RATIO: 0.78
BASOPHILS # BLD AUTO: 0.04 X10(3)/MCL
BASOPHILS NFR BLD AUTO: 0.5 %
BILIRUB SERPL-MCNC: 0.6 MG/DL
BSA FOR ECHO PROCEDURE: 2.76 M2
BUN SERPL-MCNC: 34.5 MG/DL (ref 8.4–25.7)
CALCIUM SERPL-MCNC: 9.1 MG/DL (ref 8.8–10)
CHLORIDE SERPL-SCNC: 108 MMOL/L (ref 98–107)
CHOLEST SERPL-MCNC: 75 MG/DL
CHOLEST/HDLC SERPL: 2 {RATIO} (ref 0–5)
CO2 SERPL-SCNC: 22 MMOL/L (ref 23–31)
CREAT SERPL-MCNC: 1.34 MG/DL (ref 0.73–1.18)
CV ECHO LV RWT: 0.68 CM
DOP CALC AO PEAK VEL: 1.13 M/S
DOP CALC AO VTI: 27.3 CM
DOP CALC LVOT AREA: 3.1 CM2
DOP CALC LVOT DIAMETER: 2 CM
DOP CALC LVOT PEAK VEL: 0.88 M/S
DOP CALC LVOT STROKE VOLUME: 60.6 CM3
DOP CALC MV VTI: 33.9 CM
DOP CALCLVOT PEAK VEL VTI: 19.3 CM
E WAVE DECELERATION TIME: 187 MSEC
E/A RATIO: 4.14
E/E' RATIO: 23.2 M/S
ECHO LV POSTERIOR WALL: 1.53 CM (ref 0.6–1.1)
EOSINOPHIL # BLD AUTO: 0.36 X10(3)/MCL (ref 0–0.9)
EOSINOPHIL NFR BLD AUTO: 4.5 %
ERYTHROCYTE [DISTWIDTH] IN BLOOD BY AUTOMATED COUNT: 15.2 % (ref 11.5–17)
EST. AVERAGE GLUCOSE BLD GHB EST-MCNC: 131.2 MG/DL
FERRITIN SERPL-MCNC: 255.63 NG/ML (ref 21.81–274.66)
FOLATE SERPL-MCNC: 10.5 NG/ML (ref 7–31.4)
FRACTIONAL SHORTENING: 17 % (ref 28–44)
GFR SERPLBLD CREATININE-BSD FMLA CKD-EPI: 57 MLS/MIN/1.73/M2
GLOBULIN SER-MCNC: 3.1 GM/DL (ref 2.4–3.5)
GLUCOSE SERPL-MCNC: 56 MG/DL (ref 82–115)
HBA1C MFR BLD: 6.2 %
HCT VFR BLD AUTO: 34 % (ref 42–52)
HDLC SERPL-MCNC: 31 MG/DL (ref 35–60)
HEMATOLOGIST REVIEW: NORMAL
HGB BLD-MCNC: 10.8 G/DL (ref 14–18)
IMM GRANULOCYTES # BLD AUTO: 0.03 X10(3)/MCL (ref 0–0.04)
IMM GRANULOCYTES NFR BLD AUTO: 0.4 %
INTERVENTRICULAR SEPTUM: 1.28 CM (ref 0.6–1.1)
IRON SATN MFR SERPL: 22 % (ref 20–50)
IRON SERPL-MCNC: 43 UG/DL (ref 65–175)
LDLC SERPL CALC-MCNC: 32 MG/DL (ref 50–140)
LEFT ATRIUM SIZE: 5.3 CM
LEFT INTERNAL DIMENSION IN SYSTOLE: 3.74 CM (ref 2.1–4)
LEFT VENTRICLE DIASTOLIC VOLUME INDEX: 36.25 ML/M2
LEFT VENTRICLE DIASTOLIC VOLUME: 93.9 ML
LEFT VENTRICLE MASS INDEX: 97 G/M2
LEFT VENTRICLE SYSTOLIC VOLUME INDEX: 23 ML/M2
LEFT VENTRICLE SYSTOLIC VOLUME: 59.6 ML
LEFT VENTRICULAR INTERNAL DIMENSION IN DIASTOLE: 4.53 CM (ref 3.5–6)
LEFT VENTRICULAR MASS: 252.27 G
LV LATERAL E/E' RATIO: 29 M/S
LV SEPTAL E/E' RATIO: 19.33 M/S
LVOT MG: 1 MMHG
LVOT MV: 0.53 CM/S
LYMPHOCYTES # BLD AUTO: 1.9 X10(3)/MCL (ref 0.6–4.6)
LYMPHOCYTES NFR BLD AUTO: 23.6 %
MAGNESIUM SERPL-MCNC: 1.6 MG/DL (ref 1.6–2.6)
MCH RBC QN AUTO: 29.7 PG (ref 27–31)
MCHC RBC AUTO-ENTMCNC: 31.8 G/DL (ref 33–36)
MCV RBC AUTO: 93.4 FL (ref 80–94)
MONOCYTES # BLD AUTO: 0.46 X10(3)/MCL (ref 0.1–1.3)
MONOCYTES NFR BLD AUTO: 5.7 %
MV MEAN GRADIENT: 2 MMHG
MV PEAK A VEL: 0.28 M/S
MV PEAK E VEL: 1.16 M/S
MV PEAK GRADIENT: 5 MMHG
MV STENOSIS PRESSURE HALF TIME: 83 MS
MV VALVE AREA BY CONTINUITY EQUATION: 1.79 CM2
MV VALVE AREA P 1/2 METHOD: 2.65 CM2
NEUTROPHILS # BLD AUTO: 5.25 X10(3)/MCL (ref 2.1–9.2)
NEUTROPHILS NFR BLD AUTO: 65.3 %
NRBC BLD AUTO-RTO: 0 %
OHS QRS DURATION: 98 MS
OHS QTC CALCULATION: 433 MS
PHOSPHATE SERPL-MCNC: 3.5 MG/DL (ref 2.3–4.7)
PISA TR MAX VEL: 3.7 M/S
PLATELET # BLD AUTO: 164 X10(3)/MCL (ref 130–400)
PMV BLD AUTO: 11.5 FL (ref 7.4–10.4)
POCT GLUCOSE: 127 MG/DL (ref 70–110)
POCT GLUCOSE: 141 MG/DL (ref 70–110)
POCT GLUCOSE: 175 MG/DL (ref 70–110)
POCT GLUCOSE: 194 MG/DL (ref 70–110)
POCT GLUCOSE: 79 MG/DL (ref 70–110)
POCT GLUCOSE: 94 MG/DL (ref 70–110)
POTASSIUM SERPL-SCNC: 4.2 MMOL/L (ref 3.5–5.1)
PROT SERPL-MCNC: 6.5 GM/DL (ref 5.8–7.6)
PV PEAK GRADIENT: 3 MMHG
PV PEAK VELOCITY: 0.9 M/S
RA PRESSURE ESTIMATED: 15 MMHG
RBC # BLD AUTO: 3.64 X10(6)/MCL (ref 4.7–6.1)
RIGHT VENTRICULAR END-DIASTOLIC DIMENSION: 4.01 CM
RV TB RVSP: 19 MMHG
SODIUM SERPL-SCNC: 140 MMOL/L (ref 136–145)
TDI LATERAL: 0.04 M/S
TDI SEPTAL: 0.06 M/S
TDI: 0.05 M/S
TIBC SERPL-MCNC: 154 UG/DL (ref 69–240)
TIBC SERPL-MCNC: 197 UG/DL (ref 250–450)
TR MAX PG: 55 MMHG
TRANSFERRIN SERPL-MCNC: 186 MG/DL (ref 163–344)
TRICUSPID ANNULAR PLANE SYSTOLIC EXCURSION: 1.18 CM
TRIGL SERPL-MCNC: 62 MG/DL (ref 34–140)
TROPONIN I SERPL-MCNC: 0.02 NG/ML (ref 0–0.04)
TV REST PULMONARY ARTERY PRESSURE: 70 MMHG
VIT B12 SERPL-MCNC: 392 PG/ML (ref 213–816)
VLDLC SERPL CALC-MCNC: 12 MG/DL
WBC # SPEC AUTO: 8.04 X10(3)/MCL (ref 4.5–11.5)
Z-SCORE OF LEFT VENTRICULAR DIMENSION IN END DIASTOLE: -13.75
Z-SCORE OF LEFT VENTRICULAR DIMENSION IN END SYSTOLE: -8.18

## 2024-02-07 PROCEDURE — 11000001 HC ACUTE MED/SURG PRIVATE ROOM

## 2024-02-07 PROCEDURE — 25000003 PHARM REV CODE 250: Performed by: INTERNAL MEDICINE

## 2024-02-07 PROCEDURE — 96376 TX/PRO/DX INJ SAME DRUG ADON: CPT

## 2024-02-07 PROCEDURE — 80053 COMPREHEN METABOLIC PANEL: CPT | Performed by: NURSE PRACTITIONER

## 2024-02-07 PROCEDURE — 82607 VITAMIN B-12: CPT | Performed by: INTERNAL MEDICINE

## 2024-02-07 PROCEDURE — 84484 ASSAY OF TROPONIN QUANT: CPT | Performed by: INTERNAL MEDICINE

## 2024-02-07 PROCEDURE — 82962 GLUCOSE BLOOD TEST: CPT

## 2024-02-07 PROCEDURE — 83036 HEMOGLOBIN GLYCOSYLATED A1C: CPT | Performed by: INTERNAL MEDICINE

## 2024-02-07 PROCEDURE — 80061 LIPID PANEL: CPT | Performed by: INTERNAL MEDICINE

## 2024-02-07 PROCEDURE — 85025 COMPLETE CBC W/AUTO DIFF WBC: CPT | Performed by: NURSE PRACTITIONER

## 2024-02-07 PROCEDURE — 83735 ASSAY OF MAGNESIUM: CPT | Performed by: INTERNAL MEDICINE

## 2024-02-07 PROCEDURE — 83540 ASSAY OF IRON: CPT | Performed by: INTERNAL MEDICINE

## 2024-02-07 PROCEDURE — 82746 ASSAY OF FOLIC ACID SERUM: CPT | Performed by: INTERNAL MEDICINE

## 2024-02-07 PROCEDURE — 21400001 HC TELEMETRY ROOM

## 2024-02-07 PROCEDURE — 63600175 PHARM REV CODE 636 W HCPCS: Performed by: NURSE PRACTITIONER

## 2024-02-07 PROCEDURE — 82728 ASSAY OF FERRITIN: CPT | Performed by: INTERNAL MEDICINE

## 2024-02-07 PROCEDURE — 63600175 PHARM REV CODE 636 W HCPCS: Performed by: INTERNAL MEDICINE

## 2024-02-07 PROCEDURE — 84100 ASSAY OF PHOSPHORUS: CPT | Performed by: INTERNAL MEDICINE

## 2024-02-07 RX ADMIN — INSULIN DETEMIR 20 UNITS: 100 INJECTION, SOLUTION SUBCUTANEOUS at 08:02

## 2024-02-07 RX ADMIN — FUROSEMIDE 40 MG: 10 INJECTION, SOLUTION INTRAMUSCULAR; INTRAVENOUS at 08:02

## 2024-02-07 RX ADMIN — SODIUM BICARBONATE 650 MG TABLET 650 MG: at 09:02

## 2024-02-07 RX ADMIN — ATORVASTATIN CALCIUM 40 MG: 40 TABLET, FILM COATED ORAL at 08:02

## 2024-02-07 RX ADMIN — INSULIN ASPART 1 UNITS: 100 INJECTION, SOLUTION INTRAVENOUS; SUBCUTANEOUS at 08:02

## 2024-02-07 RX ADMIN — FUROSEMIDE 40 MG: 10 INJECTION, SOLUTION INTRAMUSCULAR; INTRAVENOUS at 09:02

## 2024-02-07 RX ADMIN — METOPROLOL TARTRATE 25 MG: 25 TABLET, FILM COATED ORAL at 09:02

## 2024-02-07 RX ADMIN — PANTOPRAZOLE SODIUM 40 MG: 40 TABLET, DELAYED RELEASE ORAL at 09:02

## 2024-02-07 RX ADMIN — METOPROLOL TARTRATE 25 MG: 25 TABLET, FILM COATED ORAL at 08:02

## 2024-02-07 NOTE — CONSULTS
" [facility]  Emergency Dept    Cardiology  Consult Note    Patient Name: Terrence Woods  MRN: [id number]  Admission Date: 2/6/2024  Hospital Length of Stay: 0 days  Code Status: Full Code   Attending Provider: Aman Lr MD   Consulting Provider: Roxanne Mcgrath NP  Primary Care Physician: Anjali Craven NP  Principal Problem:<principal problem not specified>    Patient information was obtained from patient, past medical records, and ER records.     Subjective:     Reason for Consult: acute CHF    HPI: 68-year-old male that is known to [facility]/Dr. Bean with a PMHX of Afib/Aflutter s/p LAAL, HTN, HLD, CAD s/p CABG x4 (2019). He presented to [facility] on 2.6.24 after her PCP recommended for further evaluation for BLE edema, abdominal swelling, fluid retention, and dyspnea on exertion. He admits to a 27lb weight gain over the past month. The patient reports that his BLE edema began after he started taking his Meloxicam on 1.4.24, he reports taking for about 2 weeks then stopping due to the swelling.  ED course: Admit VS: HR 58, /89, RR 24, SaO2 95% on RA, Temp 98.1F. Admit labwork was notablr for H/H 11.3/34.9, PLT 75, K 5.5, Cl 111, CO2 21, BUN/Cr 35.9/1.46, .9, Trop 0.018 --> 0.023. ECHO reveals LVEF of 55-60% & indeterminate diastolic function. [facility] was consulted for CHF.     PMH:  atrial flutter/afib, DM II, arthralgia of back and shoulder, CAD, obesity, HTN, HLD,  PSH:  shoulder repair, angiogram, CABG (8.12.19)  Family Hx: Father-"heart problems"; Mother-DM II  Social Hx:  Denies any illicit drugs or ETOH use. Former tobacco use.     Cardiac Diagnostics:   TTE (2.6.24):  TDS. Definity contrast was used in this study for better delineation of LV endocardial surface border.  Left Ventricle: The left ventricle is normal in size. Increased wall thickness. There is normal systolic function with a visually estimated ejection fraction of 55 - 60%. There is indeterminate diastolic " function.  Systolic and diastolic flattening of the interventricular septum indicated of RV pressure and volume overload.  Right Ventricle: Right ventricular enlargement. Systolic function is moderately reduced.  Left Atrium: Left atrium is moderately dilated.  Right Atrium: Right atrium is dilated.  Aortic Valve: The aortic valve is a trileaflet valve. There is mild aortic valve sclerosis. Aortic valve peak velocity is 1.13 m/s. Mean gradient is 3 mmHg. There is no significant regurgitation.  Mitral Valve: There is no stenosis. The mean pressure gradient across the mitral valve is 2 mmHg at a heart rate of  bpm. There is trace regurgitation.  Tricuspid Valve: There is no stenosis. There is moderate regurgitation.  Pulmonary Artery: There is pulmonary hypertension. The estimated pulmonary artery systolic pressure is 70 mmHg.  IVC/SVC: Elevated venous pressure at 15 mmHg.  Pericardium: There is a trivial effusion. No indication of cardiac tamponade.    Arterial US (1.16.20):  The study quality is average.   All vessels appear patent with good color flow and multi-phasic waveforms obtained throughout both extremities.  Visual plaquing noted below.    TTE (7.22.19):  The study quality is below average.   The left ventricle is normal in size. Global left ventricular systolic function is borderline normal. The left ventricular ejection fraction is 50%. Left ventricular diastolic function is normal.  Moderate asymmetric septal left ventricular hypertrophy is present.   The left atrial diameter is severely increased. (5.4 cm). Volume Index is normal.  Mild calcification of the aortic valve is noted with adequate cuspal excursion.  The pulmonary artery systolic pressure is 20 mmHg.   Intra-modaility comparison Echocardiogram. Compared with the latest TTE ([facility]) dated 5.17.16  Ejection fraction has decreased from 60% to 50%.   Mitral valve area by pressure halftime has increased from 4.2 cm² to 5.8 cm².   Aortic root  diameter remains unchanged (3 cm).      LE Venous US (7.22.19):  The study quality is average.   The veins of the right and left lower extremities compress easily and augment well with normal phasic flow and no evidence of deep venous thrombosis or arterio-venous fistula on this study.  Grade I Venous Insufficiency by Valsalva noted in the Mid LFV.  Grade IV Venous Insufficiency by Valsalva noted in the LCFV.   Saphenous system interrogated with patient supine and standing.   0.8 s reflux is noted in the RGSV at the proximal thigh with a diameter of 7.4 mm.  2.8 s reflux is noted in the LGSV at the SFJ with a diameter of 5.0 mm.  1.4 s reflux is noted in the LGSV at the proximal thigh with a diameter of 6.1 mm.  Bilateral SSV's display no color flow and no compressibility.    CABG x4 (8.12.19):  LIMA to LAD, VG to Ramus, VG to OM1, VG to PDA     LHC (8.9.19):  Left main has distal 30% stenosis  Left anterior descending is occluded 100% proximally it fills from left to right and right to left collaterals  Ramus intermedius is a medium size vessel which as a 90& proximal stenosis long  Left circumflex has a calcified long 70% stenosis extending into the 1st OM which is a large vessel. Main circumflex  is a medium vessel with has an ostial 90% stenosis  Right coronary artery is a dominant vessel with a proximal stenosis of 40-50 percent and a 50% stenosis in proximal PDA  LIMA is patent        Past Medical History:   Diagnosis Date    CKD (chronic kidney disease)        History reviewed. No pertinent surgical history.    Review of patient's allergies indicates:  No Known Allergies    No current facility-administered medications on file prior to encounter.     Current Outpatient Medications on File Prior to Encounter   Medication Sig    BASAGLAR KWIKPEN U-100 INSULIN glargine 100 units/mL SubQ pen Inject 36 Units into the skin once daily. Inject  36 units in the morning and 45 in the eveneing    chlorthalidone  (HYGROTEN) 25 MG Tab Take 0.5 tablets every day by oral route for 90 days.    ferrous sulfate 325 (65 FE) MG EC tablet Take 325 mg by mouth once daily.    gabapentin (NEURONTIN) 600 MG tablet Take 600 mg by mouth 3 (three) times daily.    lisinopriL (PRINIVIL,ZESTRIL) 5 MG tablet Take 5 mg by mouth once daily.    meloxicam (MOBIC) 15 MG tablet Take 15 mg by mouth once daily.    metFORMIN (GLUCOPHAGE) 1000 MG tablet Take 1,000 mg by mouth 2 (two) times daily with meals.    omeprazole (PRILOSEC) 20 MG capsule Take 20 mg by mouth once daily.    simvastatin (ZOCOR) 5 MG tablet Take 5 mg by mouth every evening.    metoprolol tartrate (LOPRESSOR) 25 MG tablet Take 25 mg by mouth 2 (two) times daily.    nitroGLYCERIN (NITROSTAT) 0.4 MG SL tablet Place 1 tablet under the tongue every 5 (five) minutes as needed for Chest pain.     Family History    None       Tobacco Use    Smoking status: Never    Smokeless tobacco: Never   Substance and Sexual Activity    Alcohol use: Not on file    Drug use: Not on file    Sexual activity: Not on file       Review of Systems   Constitutional:  Positive for unexpected weight change. Negative for fatigue.   Respiratory:  Positive for shortness of breath. Negative for wheezing.    Cardiovascular:  Positive for leg swelling. Negative for chest pain and palpitations.   Gastrointestinal:  Positive for abdominal distention.   Neurological: Negative.    Psychiatric/Behavioral: Negative.         Objective:     Vital Signs (Most Recent):  Temp: 98.4 °F (36.9 °C) (02/07/24 0812)  Pulse: (!) 58 (02/07/24 0812)  Resp: 16 (02/07/24 0812)  BP: (!) 132/58 (02/07/24 0321)  SpO2: 96 % (02/07/24 0812) Vital Signs (24h Range):  Temp:  [97.7 °F (36.5 °C)-98.4 °F (36.9 °C)] 98.4 °F (36.9 °C)  Pulse:  [52-73] 58  Resp:  [13-24] 16  SpO2:  [95 %-99 %] 96 %  BP: (132-171)/(58-89) 132/58     Weight: (!) 158.8 kg (350 lb)  Body mass index is 53.22 kg/m².    SpO2: 96 %         Intake/Output Summary (Last 24 hours)  at 2/7/2024 0847  Last data filed at 2/7/2024 0514  Gross per 24 hour   Intake --   Output 5100 ml   Net -5100 ml       Lines/Drains/Airways       Peripheral Intravenous Line  Duration                  Peripheral IV - Single Lumen 02/06/24 1418 20 G Distal;Posterior;Right Forearm <1 day                    Significant Labs:  Recent Results (from the past 72 hour(s))   Comprehensive metabolic panel    Collection Time: 02/06/24  2:17 PM   Result Value Ref Range    Sodium Level 142 136 - 145 mmol/L    Potassium Level 5.5 (H) 3.5 - 5.1 mmol/L    Chloride 111 (H) 98 - 107 mmol/L    Carbon Dioxide 21 (L) 23 - 31 mmol/L    Glucose Level 86 82 - 115 mg/dL    Blood Urea Nitrogen 35.9 (H) 8.4 - 25.7 mg/dL    Creatinine 1.46 (H) 0.73 - 1.18 mg/dL    Calcium Level Total 9.3 8.8 - 10.0 mg/dL    Protein Total 7.0 5.8 - 7.6 gm/dL    Albumin Level 3.5 3.4 - 4.8 g/dL    Globulin 3.5 2.4 - 3.5 gm/dL    Albumin/Globulin Ratio 1.0 (L) 1.1 - 2.0 ratio    Bilirubin Total 0.5 <=1.5 mg/dL    Alkaline Phosphatase 121 40 - 150 unit/L    Alanine Aminotransferase 23 0 - 55 unit/L    Aspartate Aminotransferase 29 5 - 34 unit/L    eGFR 52 mls/min/1.73/m2   Brain natriuretic peptide    Collection Time: 02/06/24  2:17 PM   Result Value Ref Range    Natriuretic Peptide 234.9 (H) <=100.0 pg/mL   APTT    Collection Time: 02/06/24  2:17 PM   Result Value Ref Range    PTT 36.0 (H) 23.2 - 33.7 seconds   Protime-INR    Collection Time: 02/06/24  2:17 PM   Result Value Ref Range    PT 16.0 (H) 12.5 - 14.5 seconds    INR 1.3 <=1.3   Troponin I    Collection Time: 02/06/24  2:17 PM   Result Value Ref Range    Troponin-I 0.018 0.000 - 0.045 ng/mL   Urinalysis, Reflex to Urine Culture    Collection Time: 02/06/24  2:17 PM    Specimen: Urine   Result Value Ref Range    Color, UA Light-Yellow Yellow, Light-Yellow, Colorless, Straw, Dark-Yellow    Appearance, UA Clear Clear    Specific Gravity, UA 1.017 1.005 - 1.030    pH, UA 5.5 5.0 - 8.5    Protein, UA  Negative Negative    Glucose, UA Normal Negative, Normal    Ketones, UA Negative Negative    Blood, UA Negative Negative    Bilirubin, UA Negative Negative    Urobilinogen, UA 2.0 (A) 0.2, 1.0, Normal    Nitrites, UA Negative Negative    Leukocyte Esterase, UA Negative Negative    WBC, UA 0-5 None Seen, 0-2, 3-5, 0-5 /HPF    Bacteria, UA None Seen None Seen, Trace /HPF    Squamous Epithelial Cells, UA Trace None Seen /HPF    Mucous, UA Trace (A) None Seen /LPF    RBC, UA None Seen None Seen, 0-2, 3-5, 0-5 /HPF   CBC with Differential    Collection Time: 02/06/24  2:17 PM   Result Value Ref Range    WBC 10.30 4.50 - 11.50 x10(3)/mcL    RBC 3.73 (L) 4.70 - 6.10 x10(6)/mcL    Hgb 11.3 (L) 14.0 - 18.0 g/dL    Hct 34.9 (L) 42.0 - 52.0 %    MCV 93.6 80.0 - 94.0 fL    MCH 30.3 27.0 - 31.0 pg    MCHC 32.4 (L) 33.0 - 36.0 g/dL    RDW 15.2 11.5 - 17.0 %    Platelet 75 (L) 130 - 400 x10(3)/mcL    MPV 12.7 (H) 7.4 - 10.4 fL    Neut % 75.8 %    Lymph % 15.7 %    Mono % 5.3 %    Eos % 2.5 %    Basophil % 0.3 %    Lymph # 1.62 0.6 - 4.6 x10(3)/mcL    Neut # 7.80 2.1 - 9.2 x10(3)/mcL    Mono # 0.55 0.1 - 1.3 x10(3)/mcL    Eos # 0.26 0 - 0.9 x10(3)/mcL    Baso # 0.03 <=0.2 x10(3)/mcL    IG# 0.04 0 - 0.04 x10(3)/mcL    IG% 0.4 %    NRBC% 0.0 %   Echo    Collection Time: 02/06/24  5:15 PM   Result Value Ref Range    BSA 2.76 m2    LVOT stroke volume 60.60 cm3    LVIDd 4.53 3.5 - 6.0 cm    LV Systolic Volume 59.60 mL    LV Systolic Volume Index 23.0 mL/m2    LVIDs 3.74 2.1 - 4.0 cm    LV Diastolic Volume 93.90 mL    LV Diastolic Volume Index 36.25 mL/m2    IVS 1.28 (A) 0.6 - 1.1 cm    LVOT diameter 2.00 cm    LVOT area 3.1 cm2    FS 17 (A) 28 - 44 %    Left Ventricle Relative Wall Thickness 0.68 cm    Posterior Wall 1.53 (A) 0.6 - 1.1 cm    LV mass 252.27 g    LV Mass Index 97 g/m2    MV Peak E Caleb 1.16 m/s    TDI LATERAL 0.04 m/s    TDI SEPTAL 0.06 m/s    E/E' ratio 23.20 m/s    MV Peak A Caleb 0.28 m/s    TR Max Caleb 3.7 m/s    E/A  ratio 4.14     E wave deceleration time 187.00 msec    LV SEPTAL E/E' RATIO 19.33 m/s    LV LATERAL E/E' RATIO 29.00 m/s    LVOT peak becky 0.88 m/s    Left Ventricular Outflow Tract Mean Velocity 0.53 cm/s    Left Ventricular Outflow Tract Mean Gradient 1.00 mmHg    RVDD 4.01 cm    TAPSE 1.18 cm    LA size 5.30 cm    AV mean gradient 3 mmHg    AV peak gradient 5 mmHg    Ao peak becky 1.13 m/s    Ao VTI 27.30 cm    LVOT peak VTI 19.30 cm    AV valve area 2.22 cm²    AV Velocity Ratio 0.78     AV index (prosthetic) 0.71     MANDO by Velocity Ratio 2.45 cm²    MV mean gradient 2 mmHg    MV peak gradient 5 mmHg    MV stenosis pressure 1/2 time 83.00 ms    MV valve area p 1/2 method 2.65 cm2    MV valve area by continuity eq 1.79 cm2    MV VTI 33.9 cm    Triscuspid Valve Regurgitation Peak Gradient 55 mmHg    PV PEAK VELOCITY 0.90 m/s    PV peak gradient 3 mmHg    Mean e' 0.05 m/s    ZLVIDS -8.18     ZLVIDD -13.75     TV resting pulmonary artery pressure 70 mmHg    RV TB RVSP 19 mmHg    Est. RA pres 15 mmHg   Protein/Creatinine Ratio, Urine    Collection Time: 02/06/24  7:38 PM   Result Value Ref Range    Urine Protein Level <6.8 mg/dL    Urine Creatinine 20.4 (L) 63.0 - 166.0 mg/dL   Sodium, Random Urine    Collection Time: 02/06/24  7:38 PM   Result Value Ref Range    Urine Sodium 125.0 mmol/L   Urea Nitrogen, Random Urine    Collection Time: 02/06/24  7:38 PM   Result Value Ref Range    Urine Urea Nitrogen 177.0 mg/dL   Osmolality, Urine    Collection Time: 02/06/24  7:38 PM   Result Value Ref Range    Urine Osmolality 370 300 - 1,300 mOsm/kg   Creatinine, Random Urine    Collection Time: 02/06/24  7:38 PM   Result Value Ref Range    Urine Creatinine 19.5 (L) 63.0 - 166.0 mg/dL   POCT glucose    Collection Time: 02/06/24  9:02 PM   Result Value Ref Range    POCT Glucose 79 70 - 110 mg/dL   Comprehensive Metabolic Panel    Collection Time: 02/07/24  3:48 AM   Result Value Ref Range    Sodium Level 140 136 - 145 mmol/L     Potassium Level 4.2 3.5 - 5.1 mmol/L    Chloride 108 (H) 98 - 107 mmol/L    Carbon Dioxide 22 (L) 23 - 31 mmol/L    Glucose Level 56 (L) 82 - 115 mg/dL    Blood Urea Nitrogen 34.5 (H) 8.4 - 25.7 mg/dL    Creatinine 1.34 (H) 0.73 - 1.18 mg/dL    Calcium Level Total 9.1 8.8 - 10.0 mg/dL    Protein Total 6.5 5.8 - 7.6 gm/dL    Albumin Level 3.4 3.4 - 4.8 g/dL    Globulin 3.1 2.4 - 3.5 gm/dL    Albumin/Globulin Ratio 1.1 1.1 - 2.0 ratio    Bilirubin Total 0.6 <=1.5 mg/dL    Alkaline Phosphatase 110 40 - 150 unit/L    Alanine Aminotransferase 20 0 - 55 unit/L    Aspartate Aminotransferase 17 5 - 34 unit/L    eGFR 57 mls/min/1.73/m2   Hemoglobin A1C    Collection Time: 02/07/24  3:48 AM   Result Value Ref Range    Hemoglobin A1c 6.2 <=7.0 %    Estimated Average Glucose 131.2 mg/dL   Lipid Panel    Collection Time: 02/07/24  3:48 AM   Result Value Ref Range    Cholesterol Total 75 <=200 mg/dL    HDL Cholesterol 31 (L) 35 - 60 mg/dL    Triglyceride 62 34 - 140 mg/dL    Cholesterol/HDL Ratio 2 0 - 5    Very Low Density Lipoprotein 12     LDL Cholesterol 32.00 (L) 50.00 - 140.00 mg/dL   Phosphorus    Collection Time: 02/07/24  3:48 AM   Result Value Ref Range    Phosphorus Level 3.5 2.3 - 4.7 mg/dL   Magnesium    Collection Time: 02/07/24  3:48 AM   Result Value Ref Range    Magnesium Level 1.60 1.60 - 2.60 mg/dL   Iron and TIBC    Collection Time: 02/07/24  3:48 AM   Result Value Ref Range    Iron Binding Capacity Unsaturated 154 69 - 240 ug/dL    Iron Level 43 (L) 65 - 175 ug/dL    Transferrin 186 163 - 344 mg/dL    Iron Binding Capacity Total 197 (L) 250 - 450 ug/dL    Iron Saturation 22 20 - 50 %   Ferritin    Collection Time: 02/07/24  3:48 AM   Result Value Ref Range    Ferritin Level 255.63 21.81 - 274.66 ng/mL   Folate    Collection Time: 02/07/24  3:48 AM   Result Value Ref Range    Folate Level 10.5 7.0 - 31.4 ng/mL   Vitamin B12    Collection Time: 02/07/24  3:48 AM   Result Value Ref Range    Vitamin B12 Level 392  213 - 816 pg/mL   Troponin I    Collection Time: 02/07/24  3:48 AM   Result Value Ref Range    Troponin-I 0.023 0.000 - 0.045 ng/mL   CBC with Differential    Collection Time: 02/07/24  3:48 AM   Result Value Ref Range    WBC 8.04 4.50 - 11.50 x10(3)/mcL    RBC 3.64 (L) 4.70 - 6.10 x10(6)/mcL    Hgb 10.8 (L) 14.0 - 18.0 g/dL    Hct 34.0 (L) 42.0 - 52.0 %    MCV 93.4 80.0 - 94.0 fL    MCH 29.7 27.0 - 31.0 pg    MCHC 31.8 (L) 33.0 - 36.0 g/dL    RDW 15.2 11.5 - 17.0 %    Platelet 164 130 - 400 x10(3)/mcL    MPV 11.5 (H) 7.4 - 10.4 fL    Neut % 65.3 %    Lymph % 23.6 %    Mono % 5.7 %    Eos % 4.5 %    Basophil % 0.5 %    Lymph # 1.90 0.6 - 4.6 x10(3)/mcL    Neut # 5.25 2.1 - 9.2 x10(3)/mcL    Mono # 0.46 0.1 - 1.3 x10(3)/mcL    Eos # 0.36 0 - 0.9 x10(3)/mcL    Baso # 0.04 <=0.2 x10(3)/mcL    IG# 0.03 0 - 0.04 x10(3)/mcL    IG% 0.4 %    NRBC% 0.0 %   POCT glucose    Collection Time: 02/07/24  5:27 AM   Result Value Ref Range    POCT Glucose 94 70 - 110 mg/dL       Significant Imaging:  Imaging Results              US Retroperitoneal Complete (In process)                      X-Ray Chest AP Portable (Final result)  Result time 02/06/24 14:19:48      Final result by Jorge Sy MD (02/06/24 14:19:48)                   Impression:      No active pulmonary disease.    Cardiomegaly      Electronically signed by: Jorge Sy  Date:    02/06/2024  Time:    14:19               Narrative:    EXAMINATION:  XR CHEST AP PORTABLE    CLINICAL HISTORY:  Shortness of breath    TECHNIQUE:  Single frontal view of the chest was performed.    COMPARISON:  None    FINDINGS:  Examination reveals mediastinal silhouette to be within normal limits cardiac silhouette is enlarged some increase interstitial markings with no focal consolidative changes atelectases effusions or pneumothoraces                                      EKG:        Telemetry:  NSR    Physical Exam  Constitutional:       Appearance: Normal appearance.   HENT:       Head: Normocephalic.      Mouth/Throat:      Mouth: Mucous membranes are moist.   Cardiovascular:      Rate and Rhythm: Normal rate and regular rhythm.      Pulses: Normal pulses.      Heart sounds: Normal heart sounds.   Pulmonary:      Effort: Pulmonary effort is normal.      Comments: + Diminished  Abdominal:      General: There is distension.   Musculoskeletal:      Right lower leg: Edema present.      Left lower leg: Edema present.   Skin:     General: Skin is warm.   Neurological:      Mental Status: He is alert and oriented to person, place, and time.   Psychiatric:         Mood and Affect: Mood normal.         Behavior: Behavior normal.         Judgment: Judgment normal.         Home Medications:   No current facility-administered medications on file prior to encounter.     Current Outpatient Medications on File Prior to Encounter   Medication Sig Dispense Refill    BASAGLAR KWIKPEN U-100 INSULIN glargine 100 units/mL SubQ pen Inject 36 Units into the skin once daily. Inject  36 units in the morning and 45 in the eveneing      chlorthalidone (HYGROTEN) 25 MG Tab Take 0.5 tablets every day by oral route for 90 days.      ferrous sulfate 325 (65 FE) MG EC tablet Take 325 mg by mouth once daily.      gabapentin (NEURONTIN) 600 MG tablet Take 600 mg by mouth 3 (three) times daily.      lisinopriL (PRINIVIL,ZESTRIL) 5 MG tablet Take 5 mg by mouth once daily.      meloxicam (MOBIC) 15 MG tablet Take 15 mg by mouth once daily.      metFORMIN (GLUCOPHAGE) 1000 MG tablet Take 1,000 mg by mouth 2 (two) times daily with meals.      omeprazole (PRILOSEC) 20 MG capsule Take 20 mg by mouth once daily.      simvastatin (ZOCOR) 5 MG tablet Take 5 mg by mouth every evening.      metoprolol tartrate (LOPRESSOR) 25 MG tablet Take 25 mg by mouth 2 (two) times daily.      nitroGLYCERIN (NITROSTAT) 0.4 MG SL tablet Place 1 tablet under the tongue every 5 (five) minutes as needed for Chest pain.         Current Inpatient  Medications:    Current Facility-Administered Medications:     acetaminophen tablet 650 mg, 650 mg, Oral, Q8H PRN, Marianna Rivas, AGACNP-BC    acetaminophen tablet 650 mg, 650 mg, Oral, Q4H PRN, Marianna Rivas, AGACNP-BC    atorvastatin tablet 40 mg, 40 mg, Oral, QHS, Aman Lr MD, 40 mg at 02/06/24 2058    dextrose 10% bolus 125 mL 125 mL, 12.5 g, Intravenous, PRN, Marianna Rivas, AGACNP-BC    dextrose 10% bolus 250 mL 250 mL, 25 g, Intravenous, PRN, Marianna Rivas, AGACNP-BC    furosemide injection 40 mg, 40 mg, Intravenous, Q12H, Marianna Rivas, AGACNP-BC, 40 mg at 02/06/24 2058    glucagon (human recombinant) injection 1 mg, 1 mg, Intramuscular, PRN, Marianna Rivas, AGACNP-BC    glucose chewable tablet 16 g, 16 g, Oral, PRN, Marianna Rivas, AGACNP-BC    glucose chewable tablet 24 g, 24 g, Oral, PRN, Marianna Rivas, AGACNP-BC    insulin aspart U-100 injection 1-10 Units, 1-10 Units, Subcutaneous, QID (AC + HS) PRN, Marianna Rivas, AGACNP-BC    insulin detemir U-100 injection 20 Units, 20 Units, Subcutaneous, QHS, Aman Lr MD    metoprolol tartrate (LOPRESSOR) tablet 25 mg, 25 mg, Oral, BID, Aman Lr MD, 25 mg at 02/06/24 2058    nitroGLYCERIN SL tablet 0.4 mg, 0.4 mg, Sublingual, Q5 Min PRN, Aman Lr MD    ondansetron injection 4 mg, 4 mg, Intravenous, Q8H PRN, Marianna Rivas AGACNP-BC    pantoprazole EC tablet 40 mg, 40 mg, Oral, Daily, Aman Lr MD    sodium bicarbonate tablet 650 mg, 650 mg, Oral, BID, Aman Lr MD, 650 mg at 02/06/24 2058    sodium zirconium cyclosilicate packet 10 g, 10 g, Oral, Once, Marianna Rivas, [facility]-BC    Current Outpatient Medications:     BASAGLAR KWIKPEN U-100 INSULIN glargine 100 units/mL SubQ pen, Inject 36 Units into the skin once daily. Inject  36 units in the morning and 45 in the eveneing, Disp: , Rfl:     chlorthalidone (HYGROTEN) 25 MG Tab, Take 0.5 tablets every day by oral route for 90  days., Disp: , Rfl:     ferrous sulfate 325 (65 FE) MG EC tablet, Take 325 mg by mouth once daily., Disp: , Rfl:     gabapentin (NEURONTIN) 600 MG tablet, Take 600 mg by mouth 3 (three) times daily., Disp: , Rfl:     lisinopriL (PRINIVIL,ZESTRIL) 5 MG tablet, Take 5 mg by mouth once daily., Disp: , Rfl:     meloxicam (MOBIC) 15 MG tablet, Take 15 mg by mouth once daily., Disp: , Rfl:     metFORMIN (GLUCOPHAGE) 1000 MG tablet, Take 1,000 mg by mouth 2 (two) times daily with meals., Disp: , Rfl:     omeprazole (PRILOSEC) 20 MG capsule, Take 20 mg by mouth once daily., Disp: , Rfl:     simvastatin (ZOCOR) 5 MG tablet, Take 5 mg by mouth every evening., Disp: , Rfl:     metoprolol tartrate (LOPRESSOR) 25 MG tablet, Take 25 mg by mouth 2 (two) times daily., Disp: , Rfl:     nitroGLYCERIN (NITROSTAT) 0.4 MG SL tablet, Place 1 tablet under the tongue every 5 (five) minutes as needed for Chest pain., Disp: , Rfl:          VTE Risk Mitigation (From admission, onward)           Ordered     IP VTE HIGH RISK PATIENT  Once         02/06/24 1605     Place sequential compression device  Until discontinued         02/06/24 1605                    Assessment:   Elevated BNP   - .9 on admit   - ECHO (2.6.24):LVEF 55 - 60%. There is indeterminate diastolic function.  Trivial pericardial effusion   Fluid volume overload  ABDULLAHI on CKD ~ mildly improved  Hyperkalemia ~ resolved  Hyperchloremic metabolic acidosis   CAD s/p CABG x4 (8.12.19)   - LIMA to LAD, rSVG to RI, rSVG to OM, rSVG to PDA   - Maze Ablation and LESLIE Ligation  Atrial Flutter s/p LAAL (2019) ~ currently NSR   - CHADsVASc-2 points 2.2% stroke risk per year    - s/p LAAL (2019)  HLD  HTN  GERD  DM type II  Obesity        Plan:   ECHO reviewed doesn't appear to be CHF as the cause of her volume overload  Cont. IV diuresis ~ will defer to nephrology   Nephrology consulted, renal US pending   Cont. Metoprolol tartrate 25mg BID  Cont. Statin 40mg daily  Consider starting  Italo or Jardiance   AM labs: CBC, CMP, Mag    Thank you for your consult.     Roxanne Mcgrath NP  Cardiology  [facility] - Emergency Dept  02/07/2024 8:47 AM    Pt seen and examined by me, Rocky Espinoza MD. I have reviewed the NP's note, assessment and plan. I have personally interviewed and examined the patient at bedside and agree with the findings. Medical decision making listed above were done under my guidance.     Physical exam:  NAD  Cardiovascular system: regular rhythm, no murmur.  Lungs: CTAB.  Abd: S/ST/ND  Extremities: No leg edema.      Assessment and Plan:  Patient reports 4-5 weeks of progressive dyspnea with 30 lb weight gain in setting of NSAID use.  Patient discontinue his meloxicam recently however still experience MILLS with minimal exertion consistent with HFpEF.  -agree with IV diuresis  -would consider SGLT 1 inhibitor to reduce future cardiovascular mortality and hospital readmissions

## 2024-02-07 NOTE — NURSING
Nurses Note -- 4 Eyes      2/7/2024   11:27 AM      Skin assessed during: Admit      [x] No Altered Skin Integrity Present    []Prevention Measures Documented      [] Yes- Altered Skin Integrity Present or Discovered   [] LDA Added if Not in Epic (Describe Wound)   [] New Altered Skin Integrity was Present on Admit and Documented in LDA   [] Wound Image Taken    Wound Care Consulted? No    Attending Nurse:  Gini Shaffer RN/Staff Member:   TANJA Cabral

## 2024-02-07 NOTE — PROGRESS NOTES
[facility] Hospital Medicine Progress Note        Chief Complaint: Inpatient Follow-up for dyspnea on exertion    HPI:   Terrence Woods is a 68 y.o. male with a PMHx of type 2 DM, CKD stage 2, CAD s/p CABG, GERD, HTN, HLD, morbid obesity and diabetic neuropathy who presented to [facility] on 2/6/2024 at the direction of his PCP for further evaluation of bilateral lower extremity edema, abdominal swelling, fluid retention and shortness of breath upon exertion.  He also endorsed a 27 lb weight gain over the past 1 month.  Patient reports that bilateral lower extremity began after he started taking meloxicam on 01/04/2024, states he only took it about 15 days and then stopped due to the swelling.     Upon presentation to ED, vital signed included /89, HR 58, RR 24, SpO2 95% on room air, temperature 98.1° F.  Labs notable for hemoglobin 11.3, hematocrit 34.9, platelets 75, potassium 5.5, chloride 111, CO2 21, BUN 35.9, creatinine 1.46, .9.  Troponin 0.018.  Urinalysis unimpressive.  CXR negative for active pulmonary disease, cardiomegaly noted. He was given Lasix 40 mg IV in ED. Echocardiogram pending.  Admitted to hospital medicine services for further medical management.    TTE was ordered, Cardiology Nephrology were consulted and diuresis was added with urine workup.  aspirin was held due to thrombocytopenia      Interval Hx:   Afebrile, blood pressure minimally elevated.  More alert, comfortably resting today.  Son was at bedside.  He denies any worsening of shortness a breath overnight.  Responding well with diuresis.  Ambulating within the room.    Labs today showed chronic anemia, resolved thrombocytopenia, mild hyperchloremic acidosis, improved potassium, minimally improving BUN and serum creatinine, borderline hypoglycemia    Renal ultrasound showed no abnormalities.  TTE showed intact EF, indeterminate diastolic function, mild aortic valve sclerosis, trace MR, moderate  TR, pulmonary hypertension with pulmonary artery systolic pressure of 70.    Objective/physical exam:  Vitals:    02/07/24 1007 02/07/24 1022 02/07/24 1032 02/07/24 1042   BP:       Pulse: 60 77 62 66   Resp: 20 16 18 20   Temp:       TempSrc:       SpO2:       Weight:       Height:         General: In no acute distress, afebrile  Respiratory: Clear to auscultation bilaterally, nonlabored breathing  Cardiovascular: S1, S2, no appreciable murmur  Abdomen: Soft, nontender, BS +, pitting enema abdominal and pelvic wall  MSK: Warm, lower extremity edema, no clubbing or cyanosis  Neurologic: Alert and oriented x4, moving all extremities with good strength     Lab Results   Component Value Date     02/07/2024    K 4.2 02/07/2024    CO2 22 (L) 02/07/2024    BUN 34.5 (H) 02/07/2024    CREATININE 1.34 (H) 02/07/2024    CALCIUM 9.1 02/07/2024    EGFRNONAA >60 03/03/2019      Lab Results   Component Value Date    ALT 20 02/07/2024    AST 17 02/07/2024    ALKPHOS 110 02/07/2024    BILITOT 0.6 02/07/2024      Lab Results   Component Value Date    WBC 8.04 02/07/2024    HGB 10.8 (L) 02/07/2024    HCT 34.0 (L) 02/07/2024    MCV 93.4 02/07/2024     02/07/2024           Medications:   atorvastatin  40 mg Oral QHS    furosemide (LASIX) injection  40 mg Intravenous Q12H    insulin detemir U-100  20 Units Subcutaneous QHS    metoprolol tartrate  25 mg Oral BID    pantoprazole  40 mg Oral Daily    sodium bicarbonate  650 mg Oral BID    sodium zirconium cyclosilicate  10 g Oral Once      acetaminophen, acetaminophen, dextrose 10%, dextrose 10%, glucagon (human recombinant), glucose, glucose, insulin aspart U-100, nitroGLYCERIN, ondansetron     Assessment/Plan:    Volume overload requiring IV diuresis  Pulmonary hypertension  Hyperkalemia at admission-improving  ABDULLAHI on CKD stage 2  Mild hyperchloremic metabolic acidosis at admission  Thrombocytopenia-resolved   Hyperkalemia at admission-resolved    HX:  Obesity, T2 dm, HLD,  diabetic neuropathy, GERD, CAD s/p CABG x4, atrial flutter s/p left atrial ligation and ablation not on anticoagulation    Plan:  -TTE reviewed, low suspicion for acute decompensated CHF.  Diastolic function was indeterminate  - will benefit from outpatient pulmonary hypertension clinic follow up.  Continue Lasix.  Obtain CT chest with contrast if agreeable with Nephrology  -nephrology consulted for further input.  Continue diuresis.  Renal ultrasound reviewed  -thrombocytopenia resolved.  Continue aspirin  -strict monitor I's and o's, daily weights.  Continue telemetry  -home meds will be reviewed.  Encourage mobility     SCDs        Aman Lr MD

## 2024-02-07 NOTE — CONSULTS
Nephrology Initial Consult Note    Patient Name: Terrence Woods  Age: 68 y.o.  : 1955  MRN: [id number]  Admission Date: 2024    Reason for Consult:      Acute kidney injury    HPI:     Terrence Woods is a 68 y.o. male who has no previous history of any kidney problem and has been admitted to the hospital for massive fluid gain and abnormal kidney functions for which we are being consulted.  Patient has no history of any kidney stones kidney infections hematuria hesitancy frequency urgency.  His past medical history is mainly positive for type 2 diabetes mellitus CKD stage 2 coronary artery disease status post coronary artery bypass graft, GERD, hypertension and hyperlipidemia.  He started to have some back problems for which he saw the family doctor who has put him on meloxicam and he thinks he has gained fluid all over the body ever since and now he had just quit the fluid gain and came to the hospital for evaluation and management.  Patient has been started on on Lasix and has diuresed quite a bit and is beginning to feel better.  He did complain of some dyspnea on exertion.  Nocturia many times.  But no fever or chills.  No abdominal pains.  No chest pains.  No cough no fever or chills.    He does give history that he has seen a nephrologist which he thinks it might be Dr. Byrne.  But has not gone to him in many years.  He has also been through Urology evaluation in the past.  He does not know anybody's  name.  He also gives history that he has seen gastroenterologist in the past very he does not know his name either.        Current Facility-Administered Medications   Medication Dose Route Frequency Provider Last Rate Last Admin    acetaminophen tablet 650 mg  650 mg Oral Q8H PRN Marianna Rivas AGACNP-BC        acetaminophen tablet 650 mg  650 mg Oral Q4H PRN Marianna Rivas AGACNP-BC        atorvastatin tablet 40 mg  40 mg Oral QHS Aman Lr MD   40 mg at 24    dextrose  10% bolus 125 mL 125 mL  12.5 g Intravenous PRN Marianna Rivas AGACNP-BC        dextrose 10% bolus 250 mL 250 mL  25 g Intravenous PRN Marianna Rivas AGACNP-BC        furosemide injection 40 mg  40 mg Intravenous Q12H Marianna Rivas AGACNP-BC   40 mg at 02/07/24 0906    glucagon (human recombinant) injection 1 mg  1 mg Intramuscular PRN Marianna Rivas AGACNP-BC        glucose chewable tablet 16 g  16 g Oral PRN Marianna Rivas AGACNP-BC        glucose chewable tablet 24 g  24 g Oral PRN Marianna Rivas AGACNP-BC        insulin aspart U-100 injection 1-10 Units  1-10 Units Subcutaneous QID (AC + HS) PRN Marianna Rivas AGACNP-BC        insulin detemir U-100 injection 20 Units  20 Units Subcutaneous QHS Aman Lr MD        metoprolol tartrate (LOPRESSOR) tablet 25 mg  25 mg Oral BID Aman Lr MD   25 mg at 02/07/24 0906    nitroGLYCERIN SL tablet 0.4 mg  0.4 mg Sublingual Q5 Min PRN Aman Lr MD        ondansetron injection 4 mg  4 mg Intravenous Q8H PRN Marianna Rivas AGACNP-BC        pantoprazole EC tablet 40 mg  40 mg Oral Daily Aman Lr MD   40 mg at 02/07/24 0906    sodium zirconium cyclosilicate packet 10 g  10 g Oral Once Marianna Rivas AGACNP-BC Roy, Tiffany S., NP    Past Medical History:   Diagnosis Date    CKD (chronic kidney disease)       History reviewed. No pertinent surgical history.   History reviewed. No pertinent family history.  Social History     Tobacco Use    Smoking status: Never    Smokeless tobacco: Never   Substance Use Topics    Alcohol use: Not on file     Medications Prior to Admission   Medication Sig Dispense Refill Last Dose    BASAGLAR KWIKPEN U-100 INSULIN glargine 100 units/mL SubQ pen Inject 36 Units into the skin once daily. Inject  36 units in the morning and 45 in the eveneing       chlorthalidone (HYGROTEN) 25 MG Tab Take 0.5 tablets every day by oral route for 90 days.       ferrous sulfate 325 (65 FE)  MG EC tablet Take 325 mg by mouth once daily.       gabapentin (NEURONTIN) 600 MG tablet Take 600 mg by mouth 3 (three) times daily.       lisinopriL (PRINIVIL,ZESTRIL) 5 MG tablet Take 5 mg by mouth once daily.       meloxicam (MOBIC) 15 MG tablet Take 15 mg by mouth once daily.       metFORMIN (GLUCOPHAGE) 1000 MG tablet Take 1,000 mg by mouth 2 (two) times daily with meals.       omeprazole (PRILOSEC) 20 MG capsule Take 20 mg by mouth once daily.       simvastatin (ZOCOR) 5 MG tablet Take 5 mg by mouth every evening.       metoprolol tartrate (LOPRESSOR) 25 MG tablet Take 25 mg by mouth 2 (two) times daily.       nitroGLYCERIN (NITROSTAT) 0.4 MG SL tablet Place 1 tablet under the tongue every 5 (five) minutes as needed for Chest pain.        Review of patient's allergies indicates:  No Known Allergies         Review of Systems:     All 12 point of review of system was done and negative except 1 in history of present illness.    Objective:       VITAL SIGNS: 24 HR MIN & MAX LAST    Temp  Min: 97.7 °F (36.5 °C)  Max: 98.4 °F (36.9 °C)  98.2 °F (36.8 °C)        BP  Min: 132/58  Max: 171/74  (!) 160/79     Pulse  Min: 52  Max: 77  (!) 53     Resp  Min: 13  Max: 24  (!) 22    SpO2  Min: 95 %  Max: 99 %  95 %      GEN:  Very well developed and nourished white male sitting up in the recliner in no respiratory distress but gets dyspneic when he talks long sentences.  HEENT:  Atraumatic normocephalic.  Pupils reactive.  Extraocular movements intact.  No oral lesion.  Neck short thick.  No visible JVD noted.  No palpable thyromegaly noted.  CV: RRR without rub or gallop.  PULM: CTAB, unlabored  ABD: Soft, protuberant, NT/ND abdomen with NABS  EXT: No cyanosis, 3+ edema.  SKIN: Warm and dry  PSYCH: Awake, alert, and appropriately conversant  Neuro no obvious focal motor deficit noted.          Component Value Date/Time     02/07/2024 0348     02/06/2024 1417    K 4.2 02/07/2024 0348    K 5.5 (H) 02/06/2024 7972     CHLORIDE 108 (H) 02/07/2024 0348    CHLORIDE 111 (H) 02/06/2024 1417    CO2 22 (L) 02/07/2024 0348    CO2 21 (L) 02/06/2024 1417    BUN 34.5 (H) 02/07/2024 0348    BUN 35.9 (H) 02/06/2024 1417    CREATININE 1.34 (H) 02/07/2024 0348    CREATININE 1.46 (H) 02/06/2024 1417    CALCIUM 9.1 02/07/2024 0348    CALCIUM 9.3 02/06/2024 1417    PHOS 3.5 02/07/2024 0348            Component Value Date/Time    WBC 8.04 02/07/2024 0348    WBC 10.30 02/06/2024 1417    HGB 10.8 (L) 02/07/2024 0348    HGB 11.3 (L) 02/06/2024 1417    HCT 34.0 (L) 02/07/2024 0348    HCT 34.9 (L) 02/06/2024 1417     02/07/2024 0348    PLT 75 (L) 02/06/2024 1417         US Retroperitoneal Complete   Final Result      No abnormalities are seen         Electronically signed by: Stone Monte MD   Date:    02/07/2024   Time:    09:42      X-Ray Chest AP Portable   Final Result      No active pulmonary disease.      Cardiomegaly         Electronically signed by: Jorge Sy   Date:    02/06/2024   Time:    14:19      Ultrasound of the kidney reveals normal-size kidney and no hydronephrosis.    Assessment / Plan:   ABDULLAHI with possible superimposed CKD 2.  ABDULLAHI probably secondary to suspected congestive heart failure   Total body fluid excess probably secondary to suspected congestive heart failure and also history of high blood pressure and suspected high oral salt intake.  Type 2 diabetes mellitus  Hypertension   Hyperlipidemia   Morbid obesity  History of GERD   History of coronary artery disease and coronary artery bypass graft    Recommendations  Patient is detailed to stop and avoid all NSAIDs and Plaza 2 inhibitors.  Dietitian education will be done for low-salt diet on this patient.  Agree with cautious diuretic therapy  For completion sake I will go ahead order some urine protein and creatinine although urine analysis does not show any active urinary sediment.  Check labs tomorrow.      Thank you for this consultation will follow with  you.

## 2024-02-08 LAB
ALBUMIN SERPL-MCNC: 3.5 G/DL (ref 3.4–4.8)
ALBUMIN/GLOB SERPL: 1.3 RATIO (ref 1.1–2)
ALP SERPL-CCNC: 101 UNIT/L (ref 40–150)
ALT SERPL-CCNC: 17 UNIT/L (ref 0–55)
AST SERPL-CCNC: 16 UNIT/L (ref 5–34)
BASOPHILS # BLD AUTO: 0.03 X10(3)/MCL
BASOPHILS NFR BLD AUTO: 0.4 %
BILIRUB SERPL-MCNC: 0.7 MG/DL
BUN SERPL-MCNC: 28.7 MG/DL (ref 8.4–25.7)
CALCIUM SERPL-MCNC: 9.3 MG/DL (ref 8.8–10)
CHLORIDE SERPL-SCNC: 101 MMOL/L (ref 98–107)
CO2 SERPL-SCNC: 30 MMOL/L (ref 23–31)
CREAT SERPL-MCNC: 1.65 MG/DL (ref 0.73–1.18)
EOSINOPHIL # BLD AUTO: 0.33 X10(3)/MCL (ref 0–0.9)
EOSINOPHIL NFR BLD AUTO: 4.5 %
ERYTHROCYTE [DISTWIDTH] IN BLOOD BY AUTOMATED COUNT: 15 % (ref 11.5–17)
GFR SERPLBLD CREATININE-BSD FMLA CKD-EPI: 45 MLS/MIN/1.73/M2
GLOBULIN SER-MCNC: 2.7 GM/DL (ref 2.4–3.5)
GLUCOSE SERPL-MCNC: 142 MG/DL (ref 82–115)
HCT VFR BLD AUTO: 32.7 % (ref 42–52)
HGB BLD-MCNC: 10.5 G/DL (ref 14–18)
IMM GRANULOCYTES # BLD AUTO: 0.02 X10(3)/MCL (ref 0–0.04)
IMM GRANULOCYTES NFR BLD AUTO: 0.3 %
LYMPHOCYTES # BLD AUTO: 2.02 X10(3)/MCL (ref 0.6–4.6)
LYMPHOCYTES NFR BLD AUTO: 27.5 %
MAGNESIUM SERPL-MCNC: 1.7 MG/DL (ref 1.6–2.6)
MCH RBC QN AUTO: 29.7 PG (ref 27–31)
MCHC RBC AUTO-ENTMCNC: 32.1 G/DL (ref 33–36)
MCV RBC AUTO: 92.6 FL (ref 80–94)
MONOCYTES # BLD AUTO: 0.67 X10(3)/MCL (ref 0.1–1.3)
MONOCYTES NFR BLD AUTO: 9.1 %
NEUTROPHILS # BLD AUTO: 4.27 X10(3)/MCL (ref 2.1–9.2)
NEUTROPHILS NFR BLD AUTO: 58.2 %
NRBC BLD AUTO-RTO: 0 %
PLATELET # BLD AUTO: 206 X10(3)/MCL (ref 130–400)
PMV BLD AUTO: 10.7 FL (ref 7.4–10.4)
POCT GLUCOSE: 131 MG/DL (ref 70–110)
POCT GLUCOSE: 147 MG/DL (ref 70–110)
POCT GLUCOSE: 202 MG/DL (ref 70–110)
POTASSIUM SERPL-SCNC: 4.4 MMOL/L (ref 3.5–5.1)
PROT SERPL-MCNC: 6.2 GM/DL (ref 5.8–7.6)
RBC # BLD AUTO: 3.53 X10(6)/MCL (ref 4.7–6.1)
SODIUM SERPL-SCNC: 140 MMOL/L (ref 136–145)
WBC # SPEC AUTO: 7.34 X10(3)/MCL (ref 4.5–11.5)

## 2024-02-08 PROCEDURE — 63600175 PHARM REV CODE 636 W HCPCS: Performed by: INTERNAL MEDICINE

## 2024-02-08 PROCEDURE — 80053 COMPREHEN METABOLIC PANEL: CPT | Performed by: INTERNAL MEDICINE

## 2024-02-08 PROCEDURE — 83735 ASSAY OF MAGNESIUM: CPT | Performed by: INTERNAL MEDICINE

## 2024-02-08 PROCEDURE — 25000003 PHARM REV CODE 250: Performed by: INTERNAL MEDICINE

## 2024-02-08 PROCEDURE — 21400001 HC TELEMETRY ROOM

## 2024-02-08 PROCEDURE — 63600175 PHARM REV CODE 636 W HCPCS: Performed by: NURSE PRACTITIONER

## 2024-02-08 PROCEDURE — 85025 COMPLETE CBC W/AUTO DIFF WBC: CPT | Performed by: NURSE PRACTITIONER

## 2024-02-08 RX ORDER — FUROSEMIDE 40 MG/1
80 TABLET ORAL 2 TIMES DAILY
Status: DISCONTINUED | OUTPATIENT
Start: 2024-02-08 | End: 2024-02-09 | Stop reason: HOSPADM

## 2024-02-08 RX ADMIN — FUROSEMIDE 80 MG: 40 TABLET ORAL at 05:02

## 2024-02-08 RX ADMIN — ATORVASTATIN CALCIUM 40 MG: 40 TABLET, FILM COATED ORAL at 09:02

## 2024-02-08 RX ADMIN — METOPROLOL TARTRATE 25 MG: 25 TABLET, FILM COATED ORAL at 09:02

## 2024-02-08 RX ADMIN — INSULIN DETEMIR 20 UNITS: 100 INJECTION, SOLUTION SUBCUTANEOUS at 09:02

## 2024-02-08 RX ADMIN — PANTOPRAZOLE SODIUM 40 MG: 40 TABLET, DELAYED RELEASE ORAL at 09:02

## 2024-02-08 RX ADMIN — INSULIN ASPART 4 UNITS: 100 INJECTION, SOLUTION INTRAVENOUS; SUBCUTANEOUS at 05:02

## 2024-02-08 NOTE — PROGRESS NOTES
Inpatient Nutrition Evaluation    Admit Date: 2/6/2024   Total duration of encounter: 2 days   Patient Age: 68 y.o.    Nutrition Recommendation/Prescription     -Continue Heart Healthy, Diabetic Diet as tolerated; fluid restriction per MD.   -Monitor wt, labs, and intake.     Nutrition Assessment     Chart Review    Reason Seen: continuous nutrition monitoring    Malnutrition Screening Tool Results   Have you recently lost weight without trying?: No  Have you been eating poorly because of a decreased appetite?: No   MST Score: 0   Diagnosis:  Volume overload requiring IV diuresis  Pulmonary hypertension  Hyperkalemia at admission-improving  ABDULLAHI on CKD stage 2  Mild hyperchloremic metabolic acidosis at admission  Thrombocytopenia-resolved   Hyperkalemia at admission-resolve    Relevant Medical History: Obesity, T2 dm, HLD, diabetic neuropathy, GERD, CAD s/p CABG x4, atrial flutter s/p left atrial ligation and ablation not on anticoagulation     Scheduled Medications:  atorvastatin, 40 mg, QHS  insulin detemir U-100, 20 Units, QHS  metoprolol tartrate, 25 mg, BID  pantoprazole, 40 mg, Daily    Continuous Infusions:   PRN Medications: acetaminophen, acetaminophen, dextrose 10%, dextrose 10%, glucagon (human recombinant), glucose, glucose, insulin aspart U-100, nitroGLYCERIN, ondansetron    Recent Labs   Lab 02/06/24  1417 02/07/24  0348 02/08/24  0550    140 140   K 5.5* 4.2 4.4   CALCIUM 9.3 9.1 9.3   PHOS  --  3.5  --    MG  --  1.60 1.70   CHLORIDE 111* 108* 101   CO2 21* 22* 30   BUN 35.9* 34.5* 28.7*   CREATININE 1.46* 1.34* 1.65*   EGFRNORACEVR 52 57 45   GLUCOSE 86 56* 142*   BILITOT 0.5 0.6 0.7   ALKPHOS 121 110 101   ALT 23 20 17   AST 29 17 16   ALBUMIN 3.5 3.4 3.5   TRIG  --  62  --    HGBA1C  --  6.2  --    WBC 10.30 8.04 7.34   HGB 11.3* 10.8* 10.5*   HCT 34.9* 34.0* 32.7*     Nutrition Orders:  Diet heart healthy Diabetic; Fluid - 1500mL      Appetite/Oral Intake: good/% of meals  Factors  "Affecting Nutritional Intake: none identified  Food/Yazidi/Cultural Preferences: none reported  Food Allergies: no known food allergies  Last Bowel Movement: 02/07/24  Wound(s):  intact per EMR    Comments    2/8/24: Pt reports good appetite/intake; states that usual wt is around 320 lbs but fluctuates with fluid.     Anthropometrics    Height: 5' 8" (172.7 cm), Height Method: Stated  Last Weight: (!) 148.5 kg (327 lb 6.1 oz) (02/08/24 0551), Weight Method: Standard Scale  BMI (Calculated): 49.8  BMI Classification: obese grade III (BMI >/=40)        Ideal Body Weight (IBW), Male: 154 lb     % Ideal Body Weight, Male (lb): 227.27 %                          Usual Weight Provided By: patient    Wt Readings from Last 5 Encounters:   02/08/24 (!) 148.5 kg (327 lb 6.1 oz)     Weight Change(s) Since Admission:   Wt Readings from Last 1 Encounters:   02/08/24 0551 (!) 148.5 kg (327 lb 6.1 oz)   02/06/24 1335 (!) 158.8 kg (350 lb)   Admit Weight: (!) 158.8 kg (350 lb) (02/06/24 1335), Weight Method: Stated    Patient Education     Not applicable.    Nutrition Goals & Monitoring     Dietitian will monitor: energy intake and weight change    Nutrition Risk/Follow-Up: low (follow-up in 5-7 days)  Patients assigned 'low nutrition risk' status do not qualify for a full nutritional assessment but will be monitored and re-evaluated in a 5-7 day time period. Please consult if re-evaluation needed sooner.   "

## 2024-02-08 NOTE — PROGRESS NOTES
" [facility] - Observation Unit    Cardiology  Progress Note    Patient Name: Terrence Woods  MRN: [id number]  Admission Date: 2/6/2024  Hospital Length of Stay: 1 days  Code Status: Full Code   Attending Physician: Aman Lr MD   Primary Care Physician: Anjali Craven NP  Expected Discharge Date:   Principal Problem:<principal problem not specified>    Subjective:     Reason for Consult: acute CHF     HPI: 68-year-old male that is known to [facility]/Dr. Bean with a PMHX of Afib/Aflutter s/p LAAL, HTN, HLD, CAD s/p CABG x4 (2019). He presented to [facility] on 2.6.24 after her PCP recommended for further evaluation for BLE edema, abdominal swelling, fluid retention, and dyspnea on exertion. He admits to a 27lb weight gain over the past month. The patient reports that his BLE edema began after he started taking his Meloxicam on 1.4.24, he reports taking for about 2 weeks then stopping due to the swelling.  ED course: Admit VS: HR 58, /89, RR 24, SaO2 95% on RA, Temp 98.1F. Admit labwork was notablr for H/H 11.3/34.9, PLT 75, K 5.5, Cl 111, CO2 21, BUN/Cr 35.9/1.46, .9, Trop 0.018 --> 0.023. ECHO reveals LVEF of 55-60% & indeterminate diastolic function. [facility] was consulted for CHF.     Hospital Course:   2.8.24: NAD, VSS< he denies SOB or CP. He still reports BLE edema.     PMH:  atrial flutter/afib, DM II, arthralgia of back and shoulder, CAD, obesity, HTN, HLD,  PSH:  shoulder repair, angiogram, CABG (8.12.19)  Family Hx: Father-"heart problems"; Mother-DM II  Social Hx:  Denies any illicit drugs or ETOH use. Former tobacco use.     Cardiac Diagnostics:   TTE (2.6.24):  TDS. Definity contrast was used in this study for better delineation of LV endocardial surface border.  Left Ventricle: The left ventricle is normal in size. Increased wall thickness. There is normal systolic function with a visually estimated ejection fraction of 55 - 60%. There is indeterminate diastolic " function.  Systolic and diastolic flattening of the interventricular septum indicated of RV pressure and volume overload.  Right Ventricle: Right ventricular enlargement. Systolic function is moderately reduced.  Left Atrium: Left atrium is moderately dilated.  Right Atrium: Right atrium is dilated.  Aortic Valve: The aortic valve is a trileaflet valve. There is mild aortic valve sclerosis. Aortic valve peak velocity is 1.13 m/s. Mean gradient is 3 mmHg. There is no significant regurgitation.  Mitral Valve: There is no stenosis. The mean pressure gradient across the mitral valve is 2 mmHg at a heart rate of  bpm. There is trace regurgitation.  Tricuspid Valve: There is no stenosis. There is moderate regurgitation.  Pulmonary Artery: There is pulmonary hypertension. The estimated pulmonary artery systolic pressure is 70 mmHg.  IVC/SVC: Elevated venous pressure at 15 mmHg.  Pericardium: There is a trivial effusion. No indication of cardiac tamponade.     Arterial US (1.16.20):  The study quality is average.   All vessels appear patent with good color flow and multi-phasic waveforms obtained throughout both extremities.  Visual plaquing noted below.     TTE (7.22.19):  The study quality is below average.   The left ventricle is normal in size. Global left ventricular systolic function is borderline normal. The left ventricular ejection fraction is 50%. Left ventricular diastolic function is normal.  Moderate asymmetric septal left ventricular hypertrophy is present.   The left atrial diameter is severely increased. (5.4 cm). Volume Index is normal.  Mild calcification of the aortic valve is noted with adequate cuspal excursion.  The pulmonary artery systolic pressure is 20 mmHg.   Intra-modaility comparison Echocardiogram. Compared with the latest TTE ([facility]) dated 5.17.16  Ejection fraction has decreased from 60% to 50%.   Mitral valve area by pressure halftime has increased from 4.2 cm² to 5.8 cm².   Aortic  root diameter remains unchanged (3 cm).      LE Venous US (7.22.19):  The study quality is average.   The veins of the right and left lower extremities compress easily and augment well with normal phasic flow and no evidence of deep venous thrombosis or arterio-venous fistula on this study.  Grade I Venous Insufficiency by Valsalva noted in the Mid LFV.  Grade IV Venous Insufficiency by Valsalva noted in the LCFV.   Saphenous system interrogated with patient supine and standing.   0.8 s reflux is noted in the RGSV at the proximal thigh with a diameter of 7.4 mm.  2.8 s reflux is noted in the LGSV at the SFJ with a diameter of 5.0 mm.  1.4 s reflux is noted in the LGSV at the proximal thigh with a diameter of 6.1 mm.  Bilateral SSV's display no color flow and no compressibility.     CABG x4 (8.12.19):  LIMA to LAD, VG to Ramus, VG to OM1, VG to PDA     LHC (8.9.19):  Left main has distal 30% stenosis  Left anterior descending is occluded 100% proximally it fills from left to right and right to left collaterals  Ramus intermedius is a medium size vessel which as a 90& proximal stenosis long  Left circumflex has a calcified long 70% stenosis extending into the 1st OM which is a large vessel. Main circumflex  is a medium vessel with has an ostial 90% stenosis  Right coronary artery is a dominant vessel with a proximal stenosis of 40-50 percent and a 50% stenosis in proximal PDA  LIMA is patent      Review of Systems   Constitutional: Negative for chills and fever.   Cardiovascular:  Positive for leg swelling. Negative for chest pain.   Respiratory:  Negative for cough and shortness of breath.    Skin: Negative.    Musculoskeletal: Negative.    Gastrointestinal:  Negative for abdominal pain, nausea and vomiting.   Neurological: Negative.    Psychiatric/Behavioral:  Negative for altered mental status.        Objective:     Vital Signs (Most Recent):  Temp: 97.9 °F (36.6 °C) (02/08/24 0800)  Pulse: (!) 59 (02/08/24  0800)  Resp: 18 (02/07/24 1523)  BP: 127/64 (02/08/24 0800)  SpO2: 97 % (02/08/24 0800) Vital Signs (24h Range):  Temp:  [97.6 °F (36.4 °C)-98.2 °F (36.8 °C)] 97.9 °F (36.6 °C)  Pulse:  [53-77] 59  Resp:  [16-22] 18  SpO2:  [95 %-98 %] 97 %  BP: (127-170)/(52-79) 127/64     Weight: (!) 148.5 kg (327 lb 6.1 oz)  Body mass index is 49.78 kg/m².    SpO2: 97 %         Intake/Output Summary (Last 24 hours) at 2/8/2024 0907  Last data filed at 2/8/2024 0212  Gross per 24 hour   Intake 480 ml   Output 7650 ml   Net -7170 ml       Lines/Drains/Airways       Peripheral Intravenous Line  Duration                  Peripheral IV - Single Lumen 02/06/24 1418 20 G Distal;Posterior;Right Forearm 1 day                    Significant Labs:   Recent Results (from the past 72 hour(s))   EKG 12-lead    Collection Time: 02/06/24  1:34 PM   Result Value Ref Range    QRS Duration 98 ms    OHS QTC Calculation 433 ms   Comprehensive metabolic panel    Collection Time: 02/06/24  2:17 PM   Result Value Ref Range    Sodium Level 142 136 - 145 mmol/L    Potassium Level 5.5 (H) 3.5 - 5.1 mmol/L    Chloride 111 (H) 98 - 107 mmol/L    Carbon Dioxide 21 (L) 23 - 31 mmol/L    Glucose Level 86 82 - 115 mg/dL    Blood Urea Nitrogen 35.9 (H) 8.4 - 25.7 mg/dL    Creatinine 1.46 (H) 0.73 - 1.18 mg/dL    Calcium Level Total 9.3 8.8 - 10.0 mg/dL    Protein Total 7.0 5.8 - 7.6 gm/dL    Albumin Level 3.5 3.4 - 4.8 g/dL    Globulin 3.5 2.4 - 3.5 gm/dL    Albumin/Globulin Ratio 1.0 (L) 1.1 - 2.0 ratio    Bilirubin Total 0.5 <=1.5 mg/dL    Alkaline Phosphatase 121 40 - 150 unit/L    Alanine Aminotransferase 23 0 - 55 unit/L    Aspartate Aminotransferase 29 5 - 34 unit/L    eGFR 52 mls/min/1.73/m2   Brain natriuretic peptide    Collection Time: 02/06/24  2:17 PM   Result Value Ref Range    Natriuretic Peptide 234.9 (H) <=100.0 pg/mL   APTT    Collection Time: 02/06/24  2:17 PM   Result Value Ref Range    PTT 36.0 (H) 23.2 - 33.7 seconds   Protime-INR     Collection Time: 02/06/24  2:17 PM   Result Value Ref Range    PT 16.0 (H) 12.5 - 14.5 seconds    INR 1.3 <=1.3   Troponin I    Collection Time: 02/06/24  2:17 PM   Result Value Ref Range    Troponin-I 0.018 0.000 - 0.045 ng/mL   Urinalysis, Reflex to Urine Culture    Collection Time: 02/06/24  2:17 PM    Specimen: Urine   Result Value Ref Range    Color, UA Light-Yellow Yellow, Light-Yellow, Colorless, Straw, Dark-Yellow    Appearance, UA Clear Clear    Specific Gravity, UA 1.017 1.005 - 1.030    pH, UA 5.5 5.0 - 8.5    Protein, UA Negative Negative    Glucose, UA Normal Negative, Normal    Ketones, UA Negative Negative    Blood, UA Negative Negative    Bilirubin, UA Negative Negative    Urobilinogen, UA 2.0 (A) 0.2, 1.0, Normal    Nitrites, UA Negative Negative    Leukocyte Esterase, UA Negative Negative    WBC, UA 0-5 None Seen, 0-2, 3-5, 0-5 /HPF    Bacteria, UA None Seen None Seen, Trace /HPF    Squamous Epithelial Cells, UA Trace None Seen /HPF    Mucous, UA Trace (A) None Seen /LPF    RBC, UA None Seen None Seen, 0-2, 3-5, 0-5 /HPF   CBC with Differential    Collection Time: 02/06/24  2:17 PM   Result Value Ref Range    WBC 10.30 4.50 - 11.50 x10(3)/mcL    RBC 3.73 (L) 4.70 - 6.10 x10(6)/mcL    Hgb 11.3 (L) 14.0 - 18.0 g/dL    Hct 34.9 (L) 42.0 - 52.0 %    MCV 93.6 80.0 - 94.0 fL    MCH 30.3 27.0 - 31.0 pg    MCHC 32.4 (L) 33.0 - 36.0 g/dL    RDW 15.2 11.5 - 17.0 %    Platelet 75 (L) 130 - 400 x10(3)/mcL    MPV 12.7 (H) 7.4 - 10.4 fL    Neut % 75.8 %    Lymph % 15.7 %    Mono % 5.3 %    Eos % 2.5 %    Basophil % 0.3 %    Lymph # 1.62 0.6 - 4.6 x10(3)/mcL    Neut # 7.80 2.1 - 9.2 x10(3)/mcL    Mono # 0.55 0.1 - 1.3 x10(3)/mcL    Eos # 0.26 0 - 0.9 x10(3)/mcL    Baso # 0.03 <=0.2 x10(3)/mcL    IG# 0.04 0 - 0.04 x10(3)/mcL    IG% 0.4 %    NRBC% 0.0 %   Echo    Collection Time: 02/06/24  5:15 PM   Result Value Ref Range    BSA 2.76 m2    LVOT stroke volume 60.60 cm3    LVIDd 4.53 3.5 - 6.0 cm    LV Systolic  Volume 59.60 mL    LV Systolic Volume Index 23.0 mL/m2    LVIDs 3.74 2.1 - 4.0 cm    LV Diastolic Volume 93.90 mL    LV Diastolic Volume Index 36.25 mL/m2    IVS 1.28 (A) 0.6 - 1.1 cm    LVOT diameter 2.00 cm    LVOT area 3.1 cm2    FS 17 (A) 28 - 44 %    Left Ventricle Relative Wall Thickness 0.68 cm    Posterior Wall 1.53 (A) 0.6 - 1.1 cm    LV mass 252.27 g    LV Mass Index 97 g/m2    MV Peak E Caleb 1.16 m/s    TDI LATERAL 0.04 m/s    TDI SEPTAL 0.06 m/s    E/E' ratio 23.20 m/s    MV Peak A Caleb 0.28 m/s    TR Max Caleb 3.7 m/s    E/A ratio 4.14     E wave deceleration time 187.00 msec    LV SEPTAL E/E' RATIO 19.33 m/s    LV LATERAL E/E' RATIO 29.00 m/s    LVOT peak caleb 0.88 m/s    Left Ventricular Outflow Tract Mean Velocity 0.53 cm/s    Left Ventricular Outflow Tract Mean Gradient 1.00 mmHg    RVDD 4.01 cm    TAPSE 1.18 cm    LA size 5.30 cm    AV mean gradient 3 mmHg    AV peak gradient 5 mmHg    Ao peak caleb 1.13 m/s    Ao VTI 27.30 cm    LVOT peak VTI 19.30 cm    AV valve area 2.22 cm²    AV Velocity Ratio 0.78     AV index (prosthetic) 0.71     MANDO by Velocity Ratio 2.45 cm²    MV mean gradient 2 mmHg    MV peak gradient 5 mmHg    MV stenosis pressure 1/2 time 83.00 ms    MV valve area p 1/2 method 2.65 cm2    MV valve area by continuity eq 1.79 cm2    MV VTI 33.9 cm    Triscuspid Valve Regurgitation Peak Gradient 55 mmHg    PV PEAK VELOCITY 0.90 m/s    PV peak gradient 3 mmHg    Mean e' 0.05 m/s    ZLVIDS -8.18     ZLVIDD -13.75     TV resting pulmonary artery pressure 70 mmHg    RV TB RVSP 19 mmHg    Est. RA pres 15 mmHg   Path Review, Peripheral Smear    Collection Time: 02/06/24  6:17 PM   Result Value Ref Range    Peripheral Smear Evaluation       - Normocytic, normochromic anemia.  - Thrombocytopenia with frequent small platelet clumps.    Impression: Normocytic anemia can be seen in early iron deficiency anemia, anemia of chronic disease and acute blood loss. Thrombocytopenia can be due to artifactual  platelet clumping, decreased production, increased destruction (immune and non-immune mediated) or due to splenic sequestration. Recommend repeat CBC in a blue top (citrate) tube to eliminate the possibility of pseudothrombocytopenia due to platelet clumping.    Francesco Jose M.D.    Protein/Creatinine Ratio, Urine    Collection Time: 02/06/24  7:38 PM   Result Value Ref Range    Urine Protein Level <6.8 mg/dL    Urine Creatinine 20.4 (L) 63.0 - 166.0 mg/dL   Sodium, Random Urine    Collection Time: 02/06/24  7:38 PM   Result Value Ref Range    Urine Sodium 125.0 mmol/L   Urea Nitrogen, Random Urine    Collection Time: 02/06/24  7:38 PM   Result Value Ref Range    Urine Urea Nitrogen 177.0 mg/dL   Osmolality, Urine    Collection Time: 02/06/24  7:38 PM   Result Value Ref Range    Urine Osmolality 370 300 - 1,300 mOsm/kg   Creatinine, Random Urine    Collection Time: 02/06/24  7:38 PM   Result Value Ref Range    Urine Creatinine 19.5 (L) 63.0 - 166.0 mg/dL   POCT glucose    Collection Time: 02/06/24  9:02 PM   Result Value Ref Range    POCT Glucose 79 70 - 110 mg/dL   Comprehensive Metabolic Panel    Collection Time: 02/07/24  3:48 AM   Result Value Ref Range    Sodium Level 140 136 - 145 mmol/L    Potassium Level 4.2 3.5 - 5.1 mmol/L    Chloride 108 (H) 98 - 107 mmol/L    Carbon Dioxide 22 (L) 23 - 31 mmol/L    Glucose Level 56 (L) 82 - 115 mg/dL    Blood Urea Nitrogen 34.5 (H) 8.4 - 25.7 mg/dL    Creatinine 1.34 (H) 0.73 - 1.18 mg/dL    Calcium Level Total 9.1 8.8 - 10.0 mg/dL    Protein Total 6.5 5.8 - 7.6 gm/dL    Albumin Level 3.4 3.4 - 4.8 g/dL    Globulin 3.1 2.4 - 3.5 gm/dL    Albumin/Globulin Ratio 1.1 1.1 - 2.0 ratio    Bilirubin Total 0.6 <=1.5 mg/dL    Alkaline Phosphatase 110 40 - 150 unit/L    Alanine Aminotransferase 20 0 - 55 unit/L    Aspartate Aminotransferase 17 5 - 34 unit/L    eGFR 57 mls/min/1.73/m2   Hemoglobin A1C    Collection Time: 02/07/24  3:48 AM   Result Value Ref Range     Hemoglobin A1c 6.2 <=7.0 %    Estimated Average Glucose 131.2 mg/dL   Lipid Panel    Collection Time: 02/07/24  3:48 AM   Result Value Ref Range    Cholesterol Total 75 <=200 mg/dL    HDL Cholesterol 31 (L) 35 - 60 mg/dL    Triglyceride 62 34 - 140 mg/dL    Cholesterol/HDL Ratio 2 0 - 5    Very Low Density Lipoprotein 12     LDL Cholesterol 32.00 (L) 50.00 - 140.00 mg/dL   Phosphorus    Collection Time: 02/07/24  3:48 AM   Result Value Ref Range    Phosphorus Level 3.5 2.3 - 4.7 mg/dL   Magnesium    Collection Time: 02/07/24  3:48 AM   Result Value Ref Range    Magnesium Level 1.60 1.60 - 2.60 mg/dL   Iron and TIBC    Collection Time: 02/07/24  3:48 AM   Result Value Ref Range    Iron Binding Capacity Unsaturated 154 69 - 240 ug/dL    Iron Level 43 (L) 65 - 175 ug/dL    Transferrin 186 163 - 344 mg/dL    Iron Binding Capacity Total 197 (L) 250 - 450 ug/dL    Iron Saturation 22 20 - 50 %   Ferritin    Collection Time: 02/07/24  3:48 AM   Result Value Ref Range    Ferritin Level 255.63 21.81 - 274.66 ng/mL   Folate    Collection Time: 02/07/24  3:48 AM   Result Value Ref Range    Folate Level 10.5 7.0 - 31.4 ng/mL   Vitamin B12    Collection Time: 02/07/24  3:48 AM   Result Value Ref Range    Vitamin B12 Level 392 213 - 816 pg/mL   Troponin I    Collection Time: 02/07/24  3:48 AM   Result Value Ref Range    Troponin-I 0.023 0.000 - 0.045 ng/mL   CBC with Differential    Collection Time: 02/07/24  3:48 AM   Result Value Ref Range    WBC 8.04 4.50 - 11.50 x10(3)/mcL    RBC 3.64 (L) 4.70 - 6.10 x10(6)/mcL    Hgb 10.8 (L) 14.0 - 18.0 g/dL    Hct 34.0 (L) 42.0 - 52.0 %    MCV 93.4 80.0 - 94.0 fL    MCH 29.7 27.0 - 31.0 pg    MCHC 31.8 (L) 33.0 - 36.0 g/dL    RDW 15.2 11.5 - 17.0 %    Platelet 164 130 - 400 x10(3)/mcL    MPV 11.5 (H) 7.4 - 10.4 fL    Neut % 65.3 %    Lymph % 23.6 %    Mono % 5.7 %    Eos % 4.5 %    Basophil % 0.5 %    Lymph # 1.90 0.6 - 4.6 x10(3)/mcL    Neut # 5.25 2.1 - 9.2 x10(3)/mcL    Mono # 0.46 0.1 -  1.3 x10(3)/mcL    Eos # 0.36 0 - 0.9 x10(3)/mcL    Baso # 0.04 <=0.2 x10(3)/mcL    IG# 0.03 0 - 0.04 x10(3)/mcL    IG% 0.4 %    NRBC% 0.0 %   POCT glucose    Collection Time: 02/07/24  5:27 AM   Result Value Ref Range    POCT Glucose 94 70 - 110 mg/dL   POCT glucose    Collection Time: 02/07/24 10:51 AM   Result Value Ref Range    POCT Glucose 141 (H) 70 - 110 mg/dL   POCT glucose    Collection Time: 02/07/24 11:44 AM   Result Value Ref Range    POCT Glucose 175 (H) 70 - 110 mg/dL   POCT glucose    Collection Time: 02/07/24  5:02 PM   Result Value Ref Range    POCT Glucose 127 (H) 70 - 110 mg/dL   POCT glucose    Collection Time: 02/07/24  8:19 PM   Result Value Ref Range    POCT Glucose 194 (H) 70 - 110 mg/dL   POCT glucose    Collection Time: 02/08/24  5:49 AM   Result Value Ref Range    POCT Glucose 131 (H) 70 - 110 mg/dL   Comprehensive Metabolic Panel    Collection Time: 02/08/24  5:50 AM   Result Value Ref Range    Sodium Level 140 136 - 145 mmol/L    Potassium Level 4.4 3.5 - 5.1 mmol/L    Chloride 101 98 - 107 mmol/L    Carbon Dioxide 30 23 - 31 mmol/L    Glucose Level 142 (H) 82 - 115 mg/dL    Blood Urea Nitrogen 28.7 (H) 8.4 - 25.7 mg/dL    Creatinine 1.65 (H) 0.73 - 1.18 mg/dL    Calcium Level Total 9.3 8.8 - 10.0 mg/dL    Protein Total 6.2 5.8 - 7.6 gm/dL    Albumin Level 3.5 3.4 - 4.8 g/dL    Globulin 2.7 2.4 - 3.5 gm/dL    Albumin/Globulin Ratio 1.3 1.1 - 2.0 ratio    Bilirubin Total 0.7 <=1.5 mg/dL    Alkaline Phosphatase 101 40 - 150 unit/L    Alanine Aminotransferase 17 0 - 55 unit/L    Aspartate Aminotransferase 16 5 - 34 unit/L    eGFR 45 mls/min/1.73/m2   Magnesium    Collection Time: 02/08/24  5:50 AM   Result Value Ref Range    Magnesium Level 1.70 1.60 - 2.60 mg/dL   CBC with Differential    Collection Time: 02/08/24  5:50 AM   Result Value Ref Range    WBC 7.34 4.50 - 11.50 x10(3)/mcL    RBC 3.53 (L) 4.70 - 6.10 x10(6)/mcL    Hgb 10.5 (L) 14.0 - 18.0 g/dL    Hct 32.7 (L) 42.0 - 52.0 %    MCV  92.6 80.0 - 94.0 fL    MCH 29.7 27.0 - 31.0 pg    MCHC 32.1 (L) 33.0 - 36.0 g/dL    RDW 15.0 11.5 - 17.0 %    Platelet 206 130 - 400 x10(3)/mcL    MPV 10.7 (H) 7.4 - 10.4 fL    Neut % 58.2 %    Lymph % 27.5 %    Mono % 9.1 %    Eos % 4.5 %    Basophil % 0.4 %    Lymph # 2.02 0.6 - 4.6 x10(3)/mcL    Neut # 4.27 2.1 - 9.2 x10(3)/mcL    Mono # 0.67 0.1 - 1.3 x10(3)/mcL    Eos # 0.33 0 - 0.9 x10(3)/mcL    Baso # 0.03 <=0.2 x10(3)/mcL    IG# 0.02 0 - 0.04 x10(3)/mcL    IG% 0.3 %    NRBC% 0.0 %       Telemetry:  NSR    Physical Exam  Constitutional:       Appearance: Normal appearance.   HENT:      Head: Normocephalic.      Nose: Nose normal.      Mouth/Throat:      Mouth: Mucous membranes are moist.   Cardiovascular:      Rate and Rhythm: Normal rate and regular rhythm.      Pulses: Normal pulses.      Heart sounds: Normal heart sounds. No murmur heard.  Pulmonary:      Effort: Pulmonary effort is normal. No respiratory distress.      Breath sounds: Normal breath sounds.   Abdominal:      General: Abdomen is flat. There is no distension.      Palpations: Abdomen is soft.   Musculoskeletal:      Right lower leg: Edema present.      Left lower leg: Edema present.   Skin:     General: Skin is warm.   Neurological:      Mental Status: He is alert and oriented to person, place, and time.   Psychiatric:         Mood and Affect: Mood normal.         Behavior: Behavior normal.         Judgment: Judgment normal.         Current Inpatient Medications:    Current Facility-Administered Medications:     acetaminophen tablet 650 mg, 650 mg, Oral, Q8H PRN, Marianna Rivas, AGACNP-BC    acetaminophen tablet 650 mg, 650 mg, Oral, Q4H PRN, Marianna Rivas, AGACNP-BC    atorvastatin tablet 40 mg, 40 mg, Oral, QHS, Aman Lr MD, 40 mg at 02/07/24 2019    dextrose 10% bolus 125 mL 125 mL, 12.5 g, Intravenous, PRN, Marianna Rivas, AGACNP-BC    dextrose 10% bolus 250 mL 250 mL, 25 g, Intravenous, PRN, Marianna Rivas,  HEMANTHP-BC    glucagon (human recombinant) injection 1 mg, 1 mg, Intramuscular, PRN, Marianna Rivas, AGACNP-BC    glucose chewable tablet 16 g, 16 g, Oral, PRN, Marianna Rivas, AGACNP-BC    glucose chewable tablet 24 g, 24 g, Oral, PRN, Marianna Rivas, AGABARRINGTONP-BC    insulin aspart U-100 injection 1-10 Units, 1-10 Units, Subcutaneous, QID (AC + HS) PRN, Marianna Rivas AGACNP-BC, 1 Units at 02/07/24 2019    insulin detemir U-100 injection 20 Units, 20 Units, Subcutaneous, QHS, Aman Lr MD, 20 Units at 02/07/24 2019    metoprolol tartrate (LOPRESSOR) tablet 25 mg, 25 mg, Oral, BID, Aman Lr MD, 25 mg at 02/07/24 2019    nitroGLYCERIN SL tablet 0.4 mg, 0.4 mg, Sublingual, Q5 Min PRN, Aman Lr MD    ondansetron injection 4 mg, 4 mg, Intravenous, Q8H PRN, Marianna Rivas AGACNP-BC    pantoprazole EC tablet 40 mg, 40 mg, Oral, Daily, Aman Lr MD, 40 mg at 02/07/24 0906    sodium zirconium cyclosilicate packet 10 g, 10 g, Oral, Once, Marianna Rivas, HEMANTHP-BC    VTE Risk Mitigation (From admission, onward)           Ordered     IP VTE HIGH RISK PATIENT  Once         02/06/24 1605     Place sequential compression device  Until discontinued         02/06/24 1605                    Assessment:   Elevated BNP   - .9 on admit   - ECHO (2.6.24):LVEF 55 - 60%. There is indeterminate diastolic function.  Trivial pericardial effusion   Fluid volume overload   - began after starting Meloxicam ~ stopped a few days ago  ABDULLAHI on CKD ~ mildly improved  Hyperkalemia ~ resolved  Hyperchloremic metabolic acidosis   CAD s/p CABG x4 (8.12.19)   - LIMA to LAD, rSVG to RI, rSVG to OM, rSVG to PDA   - Maze Ablation and LESLIE Ligation  Atrial Flutter s/p LAAL (2019) ~ currently NSR   - CHADsVASc-2 points 2.2% stroke risk per year    - s/p LAAL (2019)  HLD  HTN  GERD  DM type II  Obesity      Plan:   ECHO reviewed doesn't appear to be CHF as the cause of her volume overload  Cont. IV  diuresis ~ will defer to nephrology   Nephrology consulted, renal US pending   Cont. Metoprolol tartrate 25mg BID  Cont. Statin 40mg daily  Consider starting Farxiga or Jardiance ~ for reduction for future cardiovascular mortality & hospital readmissions  Cardiology to sign off        Roxanne Mcgrath NP  Cardiology  [facility]  02/08/2024

## 2024-02-08 NOTE — PROGRESS NOTES
Nephrology Initial Consult Note    Patient Name: Terrence Woods  Age: 68 y.o.  : 1955  MRN: [id number]  Admission Date: 2024    Reason for Consult:      Acute kidney injury    HPI:     Terrence Woods is a 68 y.o. male who has no previous history of any kidney problem and has been admitted to the hospital for massive fluid gain and abnormal kidney functions for which we are being consulted.  Patient has no history of any kidney stones kidney infections hematuria hesitancy frequency urgency.  His past medical history is mainly positive for type 2 diabetes mellitus CKD stage 2 coronary artery disease status post coronary artery bypass graft, GERD, hypertension and hyperlipidemia.  He started to have some back problems for which he saw the family doctor who has put him on meloxicam and he thinks he has gained fluid all over the body ever since and now he had just quit the fluid gain and came to the hospital for evaluation and management.  Patient has been started on on Lasix and has diuresed quite a bit and is beginning to feel better.  He did complain of some dyspnea on exertion.  Nocturia many times.  But no fever or chills.  No abdominal pains.  No chest pains.  No cough no fever or chills.    He does give history that he has seen a nephrologist which he thinks it might be Dr. Byrne.  But has not gone to him in many years.  He has also been through Urology evaluation in the past.  He does not know anybody's  name.  He also gives history that he has seen gastroenterologist in the past very he does not know his name either.  2024  Making tons of urine.  Losing weight.  Feeling better.  Still lot of swelling of the legs.  But no shortness of breath.      Current Facility-Administered Medications   Medication Dose Route Frequency Provider Last Rate Last Admin    acetaminophen tablet 650 mg  650 mg Oral Q8H PRN Marianna Rivas, AGACNP-BC        acetaminophen tablet 650 mg  650 mg Oral Q4H PRN  Marianna Rivas AGACNP-BC        atorvastatin tablet 40 mg  40 mg Oral QHS Aman Lr MD   40 mg at 02/07/24 2019    dextrose 10% bolus 125 mL 125 mL  12.5 g Intravenous PRN Marianna Rivas AGACNP-BC        dextrose 10% bolus 250 mL 250 mL  25 g Intravenous PRN Marianna Rivas AGACNP-BC        furosemide tablet 80 mg  80 mg Oral BID Astrid Acevedo MD        glucagon (human recombinant) injection 1 mg  1 mg Intramuscular PRN Marianna Rivas AGACNP-BC        glucose chewable tablet 16 g  16 g Oral PRN Marianna Rivas AGACNP-BC        glucose chewable tablet 24 g  24 g Oral PRN Marianna Rivas AGACNP-BC        insulin aspart U-100 injection 1-10 Units  1-10 Units Subcutaneous QID (AC + HS) PRN Marianna Rivas AGACNP-BC   1 Units at 02/07/24 2019    insulin detemir U-100 injection 20 Units  20 Units Subcutaneous QHS Aman Lr MD   20 Units at 02/07/24 2019    metoprolol tartrate (LOPRESSOR) tablet 25 mg  25 mg Oral BID Aman Lr MD   25 mg at 02/08/24 0934    nitroGLYCERIN SL tablet 0.4 mg  0.4 mg Sublingual Q5 Min PRN Aman Lr MD        ondansetron injection 4 mg  4 mg Intravenous Q8H PRN Marianna Rivsa AGACNP-BC        pantoprazole EC tablet 40 mg  40 mg Oral Daily Aman Lr MD   40 mg at 02/08/24 0934       Anjali Craven NP    Past Medical History:   Diagnosis Date    CKD (chronic kidney disease)       History reviewed. No pertinent surgical history.   History reviewed. No pertinent family history.  Social History     Tobacco Use    Smoking status: Never    Smokeless tobacco: Never   Substance Use Topics    Alcohol use: Not on file     Medications Prior to Admission   Medication Sig Dispense Refill Last Dose    BASAGLAR KWIKPEN U-100 INSULIN glargine 100 units/mL SubQ pen Inject 36 Units into the skin once daily. Inject  36 units in the morning and 45 in the eveneing       chlorthalidone (HYGROTEN) 25 MG Tab Take 0.5 tablets every day by oral  route for 90 days.       ferrous sulfate 325 (65 FE) MG EC tablet Take 325 mg by mouth once daily.       gabapentin (NEURONTIN) 600 MG tablet Take 600 mg by mouth 3 (three) times daily.       lisinopriL (PRINIVIL,ZESTRIL) 5 MG tablet Take 5 mg by mouth once daily.       meloxicam (MOBIC) 15 MG tablet Take 15 mg by mouth once daily.       metFORMIN (GLUCOPHAGE) 1000 MG tablet Take 1,000 mg by mouth 2 (two) times daily with meals.       omeprazole (PRILOSEC) 20 MG capsule Take 20 mg by mouth once daily.       simvastatin (ZOCOR) 5 MG tablet Take 5 mg by mouth every evening.       metoprolol tartrate (LOPRESSOR) 25 MG tablet Take 25 mg by mouth 2 (two) times daily.       nitroGLYCERIN (NITROSTAT) 0.4 MG SL tablet Place 1 tablet under the tongue every 5 (five) minutes as needed for Chest pain.        Review of patient's allergies indicates:  No Known Allergies         Review of Systems:     All 12 point of review of system was done and negative except 1 in history of present illness.    Objective:       VITAL SIGNS: 24 HR MIN & MAX LAST    Temp  Min: 97.5 °F (36.4 °C)  Max: 98.1 °F (36.7 °C)  97.5 °F (36.4 °C)        BP  Min: 127/64  Max: 170/71  (!) 145/66     Pulse  Min: 58  Max: 70  67     Resp  Min: 19  Max: 19  19    SpO2  Min: 95 %  Max: 98 %  95 %      GEN:  Very well developed and nourished white male sitting up in the recliner in no respiratory distress but gets dyspneic when he talks long sentences.  HEENT:  Atraumatic normocephalic.  Pupils reactive.  Extraocular movements intact.  No oral lesion.  Neck short thick.  No visible JVD noted.  No palpable thyromegaly noted.  CV: RRR without rub or gallop.  PULM: CTAB, unlabored  ABD: Soft, protuberant, NT/ND abdomen with NABS  EXT: No cyanosis, 2-3+ edema.  SKIN: Warm and dry  PSYCH: Awake, alert, and appropriately conversant  Neuro no obvious focal motor deficit noted.          Component Value Date/Time     02/08/2024 0550     02/07/2024 0348    K  4.4 02/08/2024 0550    K 4.2 02/07/2024 0348    CHLORIDE 101 02/08/2024 0550    CHLORIDE 108 (H) 02/07/2024 0348    CO2 30 02/08/2024 0550    CO2 22 (L) 02/07/2024 0348    BUN 28.7 (H) 02/08/2024 0550    BUN 34.5 (H) 02/07/2024 0348    CREATININE 1.65 (H) 02/08/2024 0550    CREATININE 1.34 (H) 02/07/2024 0348    CALCIUM 9.3 02/08/2024 0550    CALCIUM 9.1 02/07/2024 0348    PHOS 3.5 02/07/2024 0348            Component Value Date/Time    WBC 7.34 02/08/2024 0550    WBC 8.04 02/07/2024 0348    HGB 10.5 (L) 02/08/2024 0550    HGB 10.8 (L) 02/07/2024 0348    HCT 32.7 (L) 02/08/2024 0550    HCT 34.0 (L) 02/07/2024 0348     02/08/2024 0550     02/07/2024 0348         US Retroperitoneal Complete   Final Result      No abnormalities are seen         Electronically signed by: Stone Monte MD   Date:    02/07/2024   Time:    09:42      X-Ray Chest AP Portable   Final Result      No active pulmonary disease.      Cardiomegaly         Electronically signed by: Jorge Sy   Date:    02/06/2024   Time:    14:19      Ultrasound of the kidney reveals normal-size kidney and no hydronephrosis.    Assessment / Plan:   ABDULLAHI with possible superimposed CKD 2.  ABDULLAHI probably secondary to suspected congestive heart failure   Total body fluid excess probably secondary to suspected congestive heart failure and also history of high blood pressure and suspected high oral salt intake.  Type 2 diabetes mellitus  Hypertension   Hyperlipidemia   Morbid obesity  History of GERD   History of coronary artery disease and coronary artery bypass graft    Recommendations  Will start a Lasix 80 mg p.o. b.i.d. at 6:00 a.m. and 2:00 p.m. and today we will give him a dose now  Patient can go home on this dose of diuretic  I can see him back in the office in about 2-4 weeks and if needed further diuretic modify patient can be done  Patient advised not to take anymore wake or NSAIDs and [place] 2 inhibitors  Also advised to follow low-salt  diet.

## 2024-02-08 NOTE — PROGRESS NOTES
[facility] Hospital Medicine Progress Note        Chief Complaint: Inpatient Follow-up for dyspnea on exertion     HPI:   Terrence Woods is a 68 y.o. male with a PMHx of type 2 DM, CKD stage 2, CAD s/p CABG, GERD, HTN, HLD, morbid obesity and diabetic neuropathy who presented to [facility] on 2/6/2024 at the direction of his PCP for further evaluation of bilateral lower extremity edema, abdominal swelling, fluid retention and shortness of breath upon exertion.  He also endorsed a 27 lb weight gain over the past 1 month.  Patient reports that bilateral lower extremity began after he started taking meloxicam on 01/04/2024, states he only took it about 15 days and then stopped due to the swelling.     Upon presentation to ED, vital signed included /89, HR 58, RR 24, SpO2 95% on room air, temperature 98.1° F.  Labs notable for hemoglobin 11.3, hematocrit 34.9, platelets 75, potassium 5.5, chloride 111, CO2 21, BUN 35.9, creatinine 1.46, .9.  Troponin 0.018.  Urinalysis unimpressive.  CXR negative for active pulmonary disease, cardiomegaly noted. He was given Lasix 40 mg IV in ED. Echocardiogram pending.  Admitted to hospital medicine services for further medical management.     TTE was ordered, Cardiology Nephrology were consulted and diuresis was added with urine workup.  aspirin was held due to thrombocytopenia        Patient currently being managed for acute diastolic CHF exacerbation.    Today's information  Patient seen and examined at bedside   Patient has no new complaints reports he is still get dyspneic on slight exertion , bilateral lower extremity swelling has improved markedly  Vitals reviewed with blood pressure elevated, heart rate within normal, saturating adequately on room air   Creatinine levels trended upwards to 1.65, GFR of 45   Glucose of 131       Objective/physical exam:    General: In no acute distress, afebrile  Respiratory: Clear to auscultation  bilaterally, nonlabored breathing  Cardiovascular: S1, S2, no appreciable murmur  Abdomen: Soft, nontender, BS +, pitting enema abdominal and pelvic wall  MSK: Warm, lower extremity edema, no clubbing or cyanosis  Neurologic: Alert and oriented x4, moving all extremities with good strength       Assessment/Plan:   Acute diastolic CHF exacerbation  Worsening kidney function  Pulmonary hypertension  Hyperkalemia at admission-improving  ABDULLAHI on CKD stage 2  Mild hyperchloremic metabolic acidosis at admission  Thrombocytopenia-resolved   Hyperkalemia at admission-resolved     HX:  Obesity, T2 dm, HLD, diabetic neuropathy, GERD, CAD s/p CABG x4, atrial flutter s/p left atrial ligation and ablation not on anticoagulation     Plan:  Discontinue IV Lasix  Follow-up Nephrology input with regards to diuresis   Repeat BMP in the morning   Patient advised on fluid restriction 1500 in 24 hours, low-salt diet  Continue I's and O's and daily weight   Cardiology recommends possible Jardiance on discharge   will benefit from outpatient pulmonary hypertension clinic follow up.  Continue Lasix.  Obtain CT chest with contrast if agreeable with Nephrology   Renal ultrasound reviewed  -thrombocytopenia resolved.  Continue aspirin  -strict monitor I's and o's, daily weights.  Continue telemetry  -home meds will be reviewed.  Encourage mobility      SCDs     Disposition likely discharge in next 24 hours       VITAL SIGNS: 24 HRS MIN & MAX LAST   Temp  Min: 97.6 °F (36.4 °C)  Max: 98.1 °F (36.7 °C) 98.1 °F (36.7 °C)   BP  Min: 127/64  Max: 170/71 137/67   Pulse  Min: 54  Max: 70  60   Resp  Min: 18  Max: 18 18   SpO2  Min: 95 %  Max: 98 % 95 %     I have reviewed the following labs:  Recent Labs   Lab 02/06/24  1417 02/07/24  0348 02/08/24  0550   WBC 10.30 8.04 7.34   RBC 3.73* 3.64* 3.53*   HGB 11.3* 10.8* 10.5*   HCT 34.9* 34.0* 32.7*   MCV 93.6 93.4 92.6   MCH 30.3 29.7 29.7   MCHC 32.4* 31.8* 32.1*   RDW 15.2 15.2 15.0   PLT 75* 164 206    MPV 12.7* 11.5* 10.7*     Recent Labs   Lab 02/06/24  1417 02/07/24  0348 02/08/24  0550    140 140   K 5.5* 4.2 4.4   CO2 21* 22* 30   BUN 35.9* 34.5* 28.7*   CREATININE 1.46* 1.34* 1.65*   CALCIUM 9.3 9.1 9.3   MG  --  1.60 1.70   ALBUMIN 3.5 3.4 3.5   ALKPHOS 121 110 101   ALT 23 20 17   AST 29 17 16   BILITOT 0.5 0.6 0.7     Microbiology Results (last 7 days)       ** No results found for the last 168 hours. **             See below for Radiology    Scheduled Med:   atorvastatin  40 mg Oral QHS    insulin detemir U-100  20 Units Subcutaneous QHS    metoprolol tartrate  25 mg Oral BID    pantoprazole  40 mg Oral Daily      Continuous Infusions:     PRN Meds:  acetaminophen, acetaminophen, dextrose 10%, dextrose 10%, glucagon (human recombinant), glucose, glucose, insulin aspart U-100, nitroGLYCERIN, ondansetron     Assessment/Plan:      VTE prophylaxis:     Patient condition:  Stable/Fair/Guarded/ Serious/ Critical    Anticipated discharge and Disposition:         All diagnosis and differential diagnosis have been reviewed; assessment and plan has been documented; I have personally reviewed the labs and test results that are presently available; I have reviewed the patients medication list; I have reviewed the consulting providers response and recommendations. I have reviewed or attempted to review medical records based upon their availability    All of the patient's questions have been  addressed and answered. Patient's is agreeable to the above stated plan. I will continue to monitor closely and make adjustments to medical management as needed.  _____________________________________________________________________    Nutrition Status:    Radiology:  I have personally reviewed the following imaging and agree with the radiologist.     US Retroperitoneal Complete  Narrative: EXAMINATION:  US RETROPERITONEAL COMPLETE    CLINICAL HISTORY:  ABDULLAHI;    COMPARISON:  None    FINDINGS:  Right kidney measures 10.3 by  5.3 x 4.8 cm there are no focal lesions noted there is no hydronephrosis.    The left kidney measures 13.3 by 6.4 x 4.7 cm.  There are no focal lesions noted.  There is no hydronephrosis.    The urinary bladder is not well distended an abnormality is not demonstrated.  Impression: No abnormalities are seen    Electronically signed by: Stone Monte MD  Date:    02/07/2024  Time:    09:42  Echo    TDS. Definity contrast was used in this study for better delineation of   LV endocardial surface border.    Left Ventricle: The left ventricle is normal in size. Increased wall   thickness. There is normal systolic function with a visually estimated   ejection fraction of 55 - 60%. There is indeterminate diastolic function.    Systolic and diastolic flattening of the interventricular septum   indicated of RV pressure and volume overload.    Right Ventricle: Right ventricular enlargement. Systolic function is   moderately reduced.    Left Atrium: Left atrium is moderately dilated.    Right Atrium: Right atrium is dilated.    Aortic Valve: The aortic valve is a trileaflet valve. There is mild   aortic valve sclerosis. Aortic valve peak velocity is 1.13 m/s. Mean   gradient is 3 mmHg. There is no significant regurgitation.    Mitral Valve: There is no stenosis. The mean pressure gradient across   the mitral valve is 2 mmHg at a heart rate of  bpm. There is trace   regurgitation.    Tricuspid Valve: There is no stenosis. There is moderate regurgitation.    Pulmonary Artery: There is pulmonary hypertension. The estimated   pulmonary artery systolic pressure is 70 mmHg.    IVC/SVC: Elevated venous pressure at 15 mmHg.    Pericardium: There is a trivial effusion. No indication of cardiac   tamponade.      Sherine Quiroga MD   02/08/2024

## 2024-02-09 VITALS
DIASTOLIC BLOOD PRESSURE: 80 MMHG | OXYGEN SATURATION: 97 % | HEIGHT: 68 IN | RESPIRATION RATE: 18 BRPM | SYSTOLIC BLOOD PRESSURE: 153 MMHG | WEIGHT: 315 LBS | TEMPERATURE: 98 F | HEART RATE: 62 BPM | BODY MASS INDEX: 47.74 KG/M2

## 2024-02-09 PROBLEM — I50.9 CHF (CONGESTIVE HEART FAILURE): Status: ACTIVE | Noted: 2024-02-09

## 2024-02-09 LAB
ANION GAP SERPL CALC-SCNC: 12 MEQ/L
BUN SERPL-MCNC: 25.5 MG/DL (ref 8.4–25.7)
CALCIUM SERPL-MCNC: 9.2 MG/DL (ref 8.8–10)
CHLORIDE SERPL-SCNC: 102 MMOL/L (ref 98–107)
CO2 SERPL-SCNC: 26 MMOL/L (ref 23–31)
CREAT SERPL-MCNC: 1.37 MG/DL (ref 0.73–1.18)
CREAT/UREA NIT SERPL: 19
GFR SERPLBLD CREATININE-BSD FMLA CKD-EPI: 56 MLS/MIN/1.73/M2
GLUCOSE SERPL-MCNC: 174 MG/DL (ref 82–115)
POCT GLUCOSE: 160 MG/DL (ref 70–110)
POTASSIUM SERPL-SCNC: 3.6 MMOL/L (ref 3.5–5.1)
SODIUM SERPL-SCNC: 140 MMOL/L (ref 136–145)

## 2024-02-09 PROCEDURE — 25000003 PHARM REV CODE 250: Performed by: INTERNAL MEDICINE

## 2024-02-09 PROCEDURE — 80048 BASIC METABOLIC PNL TOTAL CA: CPT | Performed by: INTERNAL MEDICINE

## 2024-02-09 RX ORDER — GABAPENTIN 600 MG/1
300 TABLET ORAL 3 TIMES DAILY
Qty: 45 TABLET | Refills: 11 | Status: SHIPPED | OUTPATIENT
Start: 2024-02-09 | End: 2025-02-08

## 2024-02-09 RX ORDER — FUROSEMIDE 80 MG/1
80 TABLET ORAL 2 TIMES DAILY
Qty: 60 TABLET | Refills: 11 | Status: SHIPPED | OUTPATIENT
Start: 2024-02-09 | End: 2025-02-08

## 2024-02-09 RX ADMIN — PANTOPRAZOLE SODIUM 40 MG: 40 TABLET, DELAYED RELEASE ORAL at 08:02

## 2024-02-09 RX ADMIN — METOPROLOL TARTRATE 25 MG: 25 TABLET, FILM COATED ORAL at 08:02

## 2024-02-09 RX ADMIN — FUROSEMIDE 80 MG: 40 TABLET ORAL at 08:02

## 2024-02-09 NOTE — PROGRESS NOTES
Nephrology Initial Consult Note    Patient Name: Terrence Woods  Age: 68 y.o.  : 1955  MRN: [id number]  Admission Date: 2024    Reason for Consult:      Acute kidney injury    HPI:     Terrence Woods is a 68 y.o. male who has no previous history of any kidney problem and has been admitted to the hospital for massive fluid gain and abnormal kidney functions for which we are being consulted.  Patient has no history of any kidney stones kidney infections hematuria hesitancy frequency urgency.  His past medical history is mainly positive for type 2 diabetes mellitus CKD stage 2 coronary artery disease status post coronary artery bypass graft, GERD, hypertension and hyperlipidemia.  He started to have some back problems for which he saw the family doctor who has put him on meloxicam and he thinks he has gained fluid all over the body ever since and now he had just quit the fluid gain and came to the hospital for evaluation and management.  Patient has been started on on Lasix and has diuresed quite a bit and is beginning to feel better.  He did complain of some dyspnea on exertion.  Nocturia many times.  But no fever or chills.  No abdominal pains.  No chest pains.  No cough no fever or chills.    He does give history that he has seen a nephrologist which he thinks it might be Dr. Mccullough.  But has not gone to him in many years.  He has also been through Urology evaluation in the past.  He does not know anybody's  name.  He also gives history that he has seen gastroenterologist in the past very he does not know his name either.    2024  Making tons of urine.  Losing weight.  Feeling better.  Still lot of swelling of the legs.  But no shortness of breath.    24 - UOP 5840 mL in the past 24 hours with stable renal indices.       Current Facility-Administered Medications   Medication Dose Route Frequency Provider Last Rate Last Admin    acetaminophen tablet 650 mg  650 mg Oral Q8H PRN Marianna Rivas  ALOK BRADLEY-BC        acetaminophen tablet 650 mg  650 mg Oral Q4H PRN Marianna Rivas AGACNP-BC        atorvastatin tablet 40 mg  40 mg Oral QHS Aman Lr MD   40 mg at 02/08/24 2151    dextrose 10% bolus 125 mL 125 mL  12.5 g Intravenous PRN Marianna Rivas AGACNP-BC        dextrose 10% bolus 250 mL 250 mL  25 g Intravenous PRN Marianna Rivas AGACNP-BC        furosemide tablet 80 mg  80 mg Oral BID Astrid Acevedo MD   80 mg at 02/09/24 0819    glucagon (human recombinant) injection 1 mg  1 mg Intramuscular PRN Marianna Rivas AGACNP-BC        glucose chewable tablet 16 g  16 g Oral PRN Marianna Rivas AGACNP-BC        glucose chewable tablet 24 g  24 g Oral PRN Marianna Rivas AGACNP-BC        insulin aspart U-100 injection 1-10 Units  1-10 Units Subcutaneous QID (AC + HS) PRN Marianna Rivas AGACNP-BC   4 Units at 02/08/24 1740    insulin detemir U-100 injection 20 Units  20 Units Subcutaneous QHS Aman Lr MD   20 Units at 02/08/24 2151    metoprolol tartrate (LOPRESSOR) tablet 25 mg  25 mg Oral BID Aman Lr MD   25 mg at 02/09/24 0818    nitroGLYCERIN SL tablet 0.4 mg  0.4 mg Sublingual Q5 Min PRN Aman Lr MD        ondansetron injection 4 mg  4 mg Intravenous Q8H PRN Marianna Rivas AGACNP-BC        pantoprazole EC tablet 40 mg  40 mg Oral Daily Aman Lr MD   40 mg at 02/09/24 0818       Anjali Craven NP    Past Medical History:   Diagnosis Date    CKD (chronic kidney disease)       History reviewed. No pertinent surgical history.   History reviewed. No pertinent family history.  Social History     Tobacco Use    Smoking status: Never    Smokeless tobacco: Never   Substance Use Topics    Alcohol use: Not on file     Medications Prior to Admission   Medication Sig Dispense Refill Last Dose    BASAGLAR KWIKPEN U-100 INSULIN glargine 100 units/mL SubQ pen Inject 36 Units into the skin once daily. Inject  36 units in the morning and 45 in  the eveneing       chlorthalidone (HYGROTEN) 25 MG Tab Take 0.5 tablets every day by oral route for 90 days.       ferrous sulfate 325 (65 FE) MG EC tablet Take 325 mg by mouth once daily.       gabapentin (NEURONTIN) 600 MG tablet Take 600 mg by mouth 3 (three) times daily.       lisinopriL (PRINIVIL,ZESTRIL) 5 MG tablet Take 5 mg by mouth once daily.       meloxicam (MOBIC) 15 MG tablet Take 15 mg by mouth once daily.       metFORMIN (GLUCOPHAGE) 1000 MG tablet Take 1,000 mg by mouth 2 (two) times daily with meals.       omeprazole (PRILOSEC) 20 MG capsule Take 20 mg by mouth once daily.       simvastatin (ZOCOR) 5 MG tablet Take 5 mg by mouth every evening.       metoprolol tartrate (LOPRESSOR) 25 MG tablet Take 25 mg by mouth 2 (two) times daily.       nitroGLYCERIN (NITROSTAT) 0.4 MG SL tablet Place 1 tablet under the tongue every 5 (five) minutes as needed for Chest pain.        Review of patient's allergies indicates:  No Known Allergies         Review of Systems:     All 12 point of review of system was done and negative except 1 in history of present illness.    Objective:       VITAL SIGNS: 24 HR MIN & MAX LAST    Temp  Min: 97.5 °F (36.4 °C)  Max: 98.2 °F (36.8 °C)  98.2 °F (36.8 °C)        BP  Min: 131/73  Max: 153/80  (!) 153/80     Pulse  Min: 58  Max: 67  62     Resp  Min: 16  Max: 19  18    SpO2  Min: 95 %  Max: 98 %  97 %      GEN:  obese white male sitting on couch  HEENT:  Atraumatic normocephalic.  Pupils reactive.  Extraocular movements intact.  No oral lesion.  Neck short thick.  No visible JVD noted.  No palpable thyromegaly noted.  CV: RRR without rub or gallop.  PULM: CTAB, unlabored  ABD: Soft, protuberant, NT/ND abdomen with NABS  EXT: No cyanosis, 2+ pitting edema .  SKIN: Warm and dry  PSYCH: Awake, alert, and appropriately conversant  Neuro no obvious focal motor deficit noted.          Component Value Date/Time     02/09/2024 0427     02/08/2024 0550    K 3.6 02/09/2024  0427    K 4.4 02/08/2024 0550    CHLORIDE 102 02/09/2024 0427    CHLORIDE 101 02/08/2024 0550    CO2 26 02/09/2024 0427    CO2 30 02/08/2024 0550    BUN 25.5 02/09/2024 0427    BUN 28.7 (H) 02/08/2024 0550    CREATININE 1.37 (H) 02/09/2024 0427    CREATININE 1.65 (H) 02/08/2024 0550    CALCIUM 9.2 02/09/2024 0427    CALCIUM 9.3 02/08/2024 0550    PHOS 3.5 02/07/2024 0348            Component Value Date/Time    WBC 7.34 02/08/2024 0550    WBC 8.04 02/07/2024 0348    HGB 10.5 (L) 02/08/2024 0550    HGB 10.8 (L) 02/07/2024 0348    HCT 32.7 (L) 02/08/2024 0550    HCT 34.0 (L) 02/07/2024 0348     02/08/2024 0550     02/07/2024 0348         US Retroperitoneal Complete   Final Result      No abnormalities are seen         Electronically signed by: Stone Monte MD   Date:    02/07/2024   Time:    09:42      X-Ray Chest AP Portable   Final Result      No active pulmonary disease.      Cardiomegaly         Electronically signed by: Jorge Sy   Date:    02/06/2024   Time:    14:19      Ultrasound of the kidney reveals normal-size kidney and no hydronephrosis.    Assessment / Plan:   ABDULLAHI with possible superimposed CKD 2.  ABDULLAHI probably secondary to suspected congestive heart failure   Total body fluid excess probably secondary to suspected congestive heart failure and also history of high blood pressure and suspected high oral salt intake.  Type 2 diabetes mellitus  Hypertension   Hyperlipidemia   Morbid obesity  History of GERD   History of coronary artery disease and coronary artery bypass graft    Recommendations  OK to discharge Lasix 80 mg BID.   Follow up with Dr Acevedo in 2 weeks. Renal function panel and CBC has been ordered. Patient instructed to have these labs done prior to appt

## 2024-02-09 NOTE — DISCHARGE SUMMARY
[facility] Hospital Medicine Discharge Summary    Admit Date: 2/6/2024  Discharge Date and Time: 2/9/20244:05 PM  Admitting Physician:  Team  Discharging Physician: Sherine Quiroga MD.  Primary Care Physician: Anjali Craven NP  Consults: Cardiology, Hospital Medicine, and Nephrology    Discharge Diagnoses:  Acute diastolic CHF exacerbation, improved  ABDULLAHI on ckd stage 2  Pulmonary hypertension  Hyperkalemia at admission-improving  Thrombocytopenia-resolved   Hyperkalemia at admission-resolved     HX:  Obesity, T2 dm, HLD, diabetic neuropathy, GERD, CAD s/p CABG x4, atrial flutter s/p l    Hospital Course:   Chief Complaint: Inpatient Follow-up for dyspnea on exertion     HPI:   Terrence Woods is a 68 y.o. male with a PMHx of type 2 DM, CKD stage 2, CAD s/p CABG, GERD, HTN, HLD, morbid obesity and diabetic neuropathy who presented to [facility] on 2/6/2024 at the direction of his PCP for further evaluation of bilateral lower extremity edema, abdominal swelling, fluid retention and shortness of breath upon exertion.  He also endorsed a 27 lb weight gain over the past 1 month.  Patient reports that bilateral lower extremity began after he started taking meloxicam on 01/04/2024, states he only took it about 15 days and then stopped due to the swelling.     Upon presentation to ED, vital signed included /89, HR 58, RR 24, SpO2 95% on room air, temperature 98.1° F.  Labs notable for hemoglobin 11.3, hematocrit 34.9, platelets 75, potassium 5.5, chloride 111, CO2 21, BUN 35.9, creatinine 1.46, .9.  Troponin 0.018.  Urinalysis unimpressive.  CXR negative for active pulmonary disease, cardiomegaly noted. He was given Lasix 40 mg IV in ED. Echocardiogram pending.  Admitted to hospital medicine services for further medical management.     TTE was ordered, Cardiology Nephrology were consulted and diuresis was added with urine workup.  aspirin was held due to thrombocytopenia         Patient currently being managed for acute diastolic CHF exacerbation.  Patient was diuresed with IV Lasix.  Cardiology and Nephrology were consulted.  Nephrology recommends to discharge on p.o. Lasix 80 mg b.i.d. and to follow up at the clinic in 2 weeks.  Patient encouraged to have a low-salt diet and decrease fluid intake to not more than 1500 in 24 hours.          Objective/physical exam:     General: In no acute distress, afebrile  Respiratory: Clear to auscultation bilaterally, nonlabored breathing  Cardiovascular: S1, S2, no appreciable murmur  Abdomen: Soft, nontender, BS +, pitting enema abdominal and pelvic wall  MSK: Warm, lower extremity edema, no clubbing or cyanosis  Neurologic: Alert and oriented x4, moving all extremities with good strength        Pt was seen and examined on the day of discharge  Vitals:  VITAL SIGNS: 24 HRS MIN & MAX LAST   Temp  Min: 97.8 °F (36.6 °C)  Max: 98.2 °F (36.8 °C) 98.2 °F (36.8 °C)   BP  Min: 131/73  Max: 153/80 (!) 153/80   Pulse  Min: 58  Max: 64  62   Resp  Min: 16  Max: 18 18   SpO2  Min: 96 %  Max: 98 % 97 %         Procedures Performed: No admission procedures for hospital encounter.     Significant Diagnostic Studies: See Full reports for all details    Recent Labs   Lab 02/06/24  1417 02/07/24  0348 02/08/24  0550   WBC 10.30 8.04 7.34   RBC 3.73* 3.64* 3.53*   HGB 11.3* 10.8* 10.5*   HCT 34.9* 34.0* 32.7*   MCV 93.6 93.4 92.6   MCH 30.3 29.7 29.7   MCHC 32.4* 31.8* 32.1*   RDW 15.2 15.2 15.0   PLT 75* 164 206   MPV 12.7* 11.5* 10.7*       Recent Labs   Lab 02/06/24  1417 02/07/24  0348 02/08/24  0550 02/09/24  0427    140 140 140   K 5.5* 4.2 4.4 3.6   CO2 21* 22* 30 26   BUN 35.9* 34.5* 28.7* 25.5   CREATININE 1.46* 1.34* 1.65* 1.37*   CALCIUM 9.3 9.1 9.3 9.2   MG  --  1.60 1.70  --    ALBUMIN 3.5 3.4 3.5  --    ALKPHOS 121 110 101  --    ALT 23 20 17  --    AST 29 17 16  --    BILITOT 0.5 0.6 0.7  --         Microbiology Results (last 7 days)       **  No results found for the last 168 hours. **             US Retroperitoneal Complete  Narrative: EXAMINATION:  US RETROPERITONEAL COMPLETE    CLINICAL HISTORY:  ABDULLAHI;    COMPARISON:  None    FINDINGS:  Right kidney measures 10.3 by 5.3 x 4.8 cm there are no focal lesions noted there is no hydronephrosis.    The left kidney measures 13.3 by 6.4 x 4.7 cm.  There are no focal lesions noted.  There is no hydronephrosis.    The urinary bladder is not well distended an abnormality is not demonstrated.  Impression: No abnormalities are seen    Electronically signed by: Stone Monte MD  Date:    02/07/2024  Time:    09:42  Echo    TDS. Definity contrast was used in this study for better delineation of   LV endocardial surface border.    Left Ventricle: The left ventricle is normal in size. Increased wall   thickness. There is normal systolic function with a visually estimated   ejection fraction of 55 - 60%. There is indeterminate diastolic function.    Systolic and diastolic flattening of the interventricular septum   indicated of RV pressure and volume overload.    Right Ventricle: Right ventricular enlargement. Systolic function is   moderately reduced.    Left Atrium: Left atrium is moderately dilated.    Right Atrium: Right atrium is dilated.    Aortic Valve: The aortic valve is a trileaflet valve. There is mild   aortic valve sclerosis. Aortic valve peak velocity is 1.13 m/s. Mean   gradient is 3 mmHg. There is no significant regurgitation.    Mitral Valve: There is no stenosis. The mean pressure gradient across   the mitral valve is 2 mmHg at a heart rate of  bpm. There is trace   regurgitation.    Tricuspid Valve: There is no stenosis. There is moderate regurgitation.    Pulmonary Artery: There is pulmonary hypertension. The estimated   pulmonary artery systolic pressure is 70 mmHg.    IVC/SVC: Elevated venous pressure at 15 mmHg.    Pericardium: There is a trivial effusion. No indication of cardiac    tamponade.         Medication List        START taking these medications      furosemide 80 MG tablet  Commonly known as: LASIX  Take 1 tablet (80 mg total) by mouth 2 (two) times daily.            CHANGE how you take these medications      gabapentin 600 MG tablet  Commonly known as: NEURONTIN  Take 0.5 tablets (300 mg total) by mouth 3 (three) times daily.  What changed: how much to take            CONTINUE taking these medications      BASAGLAR KWIKPEN U-100 INSULIN glargine 100 units/mL SubQ pen  Generic drug: insulin     ferrous sulfate 325 (65 FE) MG EC tablet     metFORMIN 1000 MG tablet  Commonly known as: GLUCOPHAGE     metoprolol tartrate 25 MG tablet  Commonly known as: LOPRESSOR     nitroGLYCERIN 0.4 MG SL tablet  Commonly known as: NITROSTAT     omeprazole 20 MG capsule  Commonly known as: PRILOSEC     simvastatin 5 MG tablet  Commonly known as: ZOCOR            STOP taking these medications      chlorthalidone 25 MG Tab  Commonly known as: HYGROTEN     lisinopriL 5 MG tablet  Commonly known as: PRINIVIL,ZESTRIL     meloxicam 15 MG tablet  Commonly known as: MOBIC               Where to Get Your Medications        These medications were sent to [facility] [address] [facility]  [address] [place] [address]      Phone: [phone]   furosemide 80 MG tablet  gabapentin 600 MG tablet          Explained in detail to the patient about the discharge plan, medications, and follow-up visits. Pt understands and agrees with the treatment plan  Discharge Disposition: Home or Self Care   Discharged Condition: stable  Diet-    Medications Per DC med rec  Activities as tolerated   Follow-up Information       Anjali Craven NP Follow up on 2/9/2024.    Specialty: Family Medicine  Why: Office will call patient to schedule follow-up appt.  Contact information:  [address]   [place] [address]  [phone]               Frank Bean MD Follow up on 2/22/2024.     Specialty: Cardiology  Why: @ 9:20AM  Contact information:  [place] [address]  [phone]               Astrid Acevedo MD Follow up on 2/19/2024.    Specialty: Nephrology  Why: Renal function panel, CBC prior to appointment  Follow-up appt. 2/19/24 @10am.  Contact information:  [address] [place] [address]  [phone]                           For further questions contact hospitalist office    Discharge time 33 minutes    For worsening symptoms, chest pain, shortness of breath, increased abdominal pain, high grade fever, stroke or stroke like symptoms, immediately go to the nearest Emergency Room or call 911 as soon as possible.      Sherine Yee M.D on 2/9/2024. at 4:05 PM.

## 2024-02-15 ENCOUNTER — PATIENT OUTREACH (OUTPATIENT)
Dept: ADMINISTRATIVE | Facility: CLINIC | Age: 69
End: 2024-02-15
Payer: MEDICARE

## 2024-02-15 NOTE — PROGRESS NOTES
C3 nurse attempted to contact Terrence Woods  for a TCC post hospital discharge follow up call. No answer. Left voicemail with callback information. The patient has a scheduled HOSFU appointment with Dr. Bean on 02/22/2024 @ 920 am.   Appointment with Dr. Acevedo on 02/19/2024 @ 10 am.

## 2024-02-16 ENCOUNTER — PATIENT MESSAGE (OUTPATIENT)
Dept: ADMINISTRATIVE | Facility: CLINIC | Age: 69
End: 2024-02-16
Payer: MEDICARE

## 2024-02-16 NOTE — PROGRESS NOTES
C3 nurse attempted to contact Terrence Woods  for a TCC post hospital discharge follow up call. No answer. Left voicemail with callback information. The patient has a scheduled HOSFU appointment with Dr. Bean on 02/22/2024 @ 920 am.   Appointment with Dr. Acevedo on 02/19/2024 @ 10 am.  Message sent via patient's portal regarding follow up call.

## 2024-10-30 DIAGNOSIS — R41.89 IMPAIRED COGNITION: Primary | ICD-10-CM

## 2025-03-10 ENCOUNTER — HOSPITAL ENCOUNTER (EMERGENCY)
Facility: HOSPITAL | Age: 70
Discharge: HOME | End: 2025-03-11
Attending: EMERGENCY MEDICINE
Payer: MEDICARE

## 2025-03-10 DIAGNOSIS — R06.09 DYSPNEA ON EXERTION: ICD-10-CM

## 2025-03-10 DIAGNOSIS — I10 BENIGN ESSENTIAL HTN: ICD-10-CM

## 2025-03-10 DIAGNOSIS — I50.33 ACUTE ON CHRONIC DIASTOLIC CONGESTIVE HEART FAILURE: Primary | ICD-10-CM

## 2025-03-10 DIAGNOSIS — R06.02 SHORTNESS OF BREATH: ICD-10-CM

## 2025-03-10 LAB
ALBUMIN SERPL-MCNC: 3.4 G/DL (ref 3.4–4.8)
ALBUMIN/GLOB SERPL: 1.2 RATIO (ref 1.1–2)
ALP SERPL-CCNC: 90 UNIT/L (ref 40–150)
ALT SERPL-CCNC: 28 UNIT/L (ref 0–55)
ANION GAP SERPL CALC-SCNC: 7 MEQ/L
AST SERPL-CCNC: 19 UNIT/L (ref 5–34)
BACTERIA #/AREA URNS AUTO: ABNORMAL /HPF
BASOPHILS # BLD AUTO: 0.03 X10(3)/MCL
BASOPHILS NFR BLD AUTO: 0.4 %
BILIRUB SERPL-MCNC: 0.4 MG/DL
BILIRUB UR QL STRIP.AUTO: NEGATIVE
BNP BLD-MCNC: 242.9 PG/ML
BUN SERPL-MCNC: 13 MG/DL (ref 8.4–25.7)
CALCIUM SERPL-MCNC: 8.6 MG/DL (ref 8.8–10)
CHLORIDE SERPL-SCNC: 109 MMOL/L (ref 98–107)
CLARITY UR: CLEAR
CO2 SERPL-SCNC: 24 MMOL/L (ref 23–31)
COLOR UR AUTO: YELLOW
CREAT SERPL-MCNC: 1.18 MG/DL (ref 0.72–1.25)
CREAT/UREA NIT SERPL: 11
EOSINOPHIL # BLD AUTO: 0.28 X10(3)/MCL (ref 0–0.9)
EOSINOPHIL NFR BLD AUTO: 3.8 %
ERYTHROCYTE [DISTWIDTH] IN BLOOD BY AUTOMATED COUNT: 14.6 % (ref 11.5–17)
GFR SERPLBLD CREATININE-BSD FMLA CKD-EPI: >60 ML/MIN/1.73/M2
GLOBULIN SER-MCNC: 2.8 GM/DL (ref 2.4–3.5)
GLUCOSE SERPL-MCNC: 184 MG/DL (ref 82–115)
GLUCOSE UR QL STRIP: ABNORMAL
HCT VFR BLD AUTO: 33.2 % (ref 42–52)
HGB BLD-MCNC: 10.6 G/DL (ref 14–18)
HGB UR QL STRIP: NEGATIVE
IMM GRANULOCYTES # BLD AUTO: 0.03 X10(3)/MCL (ref 0–0.04)
IMM GRANULOCYTES NFR BLD AUTO: 0.4 %
KETONES UR QL STRIP: NEGATIVE
LEUKOCYTE ESTERASE UR QL STRIP: NEGATIVE
LYMPHOCYTES # BLD AUTO: 1.79 X10(3)/MCL (ref 0.6–4.6)
LYMPHOCYTES NFR BLD AUTO: 24.5 %
MCH RBC QN AUTO: 30.5 PG (ref 27–31)
MCHC RBC AUTO-ENTMCNC: 31.9 G/DL (ref 33–36)
MCV RBC AUTO: 95.7 FL (ref 80–94)
MONOCYTES # BLD AUTO: 0.45 X10(3)/MCL (ref 0.1–1.3)
MONOCYTES NFR BLD AUTO: 6.1 %
MUCOUS THREADS URNS QL MICRO: ABNORMAL /LPF
NEUTROPHILS # BLD AUTO: 4.74 X10(3)/MCL (ref 2.1–9.2)
NEUTROPHILS NFR BLD AUTO: 64.8 %
NITRITE UR QL STRIP: NEGATIVE
NRBC BLD AUTO-RTO: 0 %
PH UR STRIP: 5.5 [PH]
PLATELET # BLD AUTO: 179 X10(3)/MCL (ref 130–400)
PMV BLD AUTO: 10.2 FL (ref 7.4–10.4)
POTASSIUM SERPL-SCNC: 4.7 MMOL/L (ref 3.5–5.1)
PROT SERPL-MCNC: 6.2 GM/DL (ref 5.8–7.6)
PROT UR QL STRIP: ABNORMAL
RBC # BLD AUTO: 3.47 X10(6)/MCL (ref 4.7–6.1)
RBC #/AREA URNS AUTO: ABNORMAL /HPF
SODIUM SERPL-SCNC: 140 MMOL/L (ref 136–145)
SP GR UR STRIP.AUTO: 1.02 (ref 1–1.03)
SQUAMOUS #/AREA URNS LPF: ABNORMAL /HPF
TROPONIN I SERPL-MCNC: <0.01 NG/ML (ref 0–0.04)
TROPONIN I SERPL-MCNC: <0.01 NG/ML (ref 0–0.04)
UROBILINOGEN UR STRIP-ACNC: 2
WBC # BLD AUTO: 7.32 X10(3)/MCL (ref 4.5–11.5)
WBC #/AREA URNS AUTO: ABNORMAL /HPF

## 2025-03-10 PROCEDURE — 83880 ASSAY OF NATRIURETIC PEPTIDE: CPT | Performed by: PHYSICIAN ASSISTANT

## 2025-03-10 PROCEDURE — 80053 COMPREHEN METABOLIC PANEL: CPT | Performed by: PHYSICIAN ASSISTANT

## 2025-03-10 PROCEDURE — 84484 ASSAY OF TROPONIN QUANT: CPT | Performed by: EMERGENCY MEDICINE

## 2025-03-10 PROCEDURE — 93005 ELECTROCARDIOGRAM TRACING: CPT

## 2025-03-10 PROCEDURE — 93010 ELECTROCARDIOGRAM REPORT: CPT | Mod: ,,, | Performed by: INTERNAL MEDICINE

## 2025-03-10 PROCEDURE — 84484 ASSAY OF TROPONIN QUANT: CPT | Performed by: PHYSICIAN ASSISTANT

## 2025-03-10 PROCEDURE — 85025 COMPLETE CBC W/AUTO DIFF WBC: CPT | Performed by: PHYSICIAN ASSISTANT

## 2025-03-10 PROCEDURE — 99285 EMERGENCY DEPT VISIT HI MDM: CPT | Mod: 25

## 2025-03-10 PROCEDURE — 81001 URINALYSIS AUTO W/SCOPE: CPT | Performed by: PHYSICIAN ASSISTANT

## 2025-03-10 NOTE — FIRST PROVIDER EVALUATION
"Medical screening examination initiated.  I have conducted a focused provider triage encounter, findings are as follows:    Brief history of present illness:  69-year-old male presents to ED for evaluation of shortness of breath and leg swelling for the past week. Hx of CHF.    Vitals:    03/10/25 1804   BP: (!) 133/59   Pulse: (!) 52   Resp: (!) 22   Temp: 98.6 °F (37 °C)   TempSrc: Oral   SpO2: 98%   Weight: 136.1 kg (300 lb)   Height: 5' 8" (1.727 m)       Pertinent physical exam:  Patient awake and alert sitting in wheelchair    Brief workup plan:  Labs, EKG and with shortness of    Preliminary workup initiated; this workup will be continued and followed by the physician or advanced practice provider that is assigned to the patient when roomed.  "

## 2025-03-11 VITALS
WEIGHT: 300 LBS | HEART RATE: 56 BPM | RESPIRATION RATE: 16 BRPM | DIASTOLIC BLOOD PRESSURE: 55 MMHG | HEIGHT: 68 IN | TEMPERATURE: 99 F | BODY MASS INDEX: 45.47 KG/M2 | SYSTOLIC BLOOD PRESSURE: 134 MMHG | OXYGEN SATURATION: 100 %

## 2025-03-11 LAB
OHS QRS DURATION: 146 MS
OHS QTC CALCULATION: 476 MS

## 2025-03-11 PROCEDURE — 63600175 PHARM REV CODE 636 W HCPCS: Performed by: EMERGENCY MEDICINE

## 2025-03-11 PROCEDURE — 96374 THER/PROPH/DIAG INJ IV PUSH: CPT

## 2025-03-11 RX ORDER — FUROSEMIDE 80 MG/1
80 TABLET ORAL 2 TIMES DAILY
Qty: 180 TABLET | Refills: 0 | Status: SHIPPED | OUTPATIENT
Start: 2025-03-11 | End: 2025-06-09

## 2025-03-11 RX ORDER — FUROSEMIDE 10 MG/ML
80 INJECTION INTRAMUSCULAR; INTRAVENOUS
Status: COMPLETED | OUTPATIENT
Start: 2025-03-11 | End: 2025-03-11

## 2025-03-11 RX ADMIN — FUROSEMIDE 80 MG: 10 INJECTION, SOLUTION INTRAVENOUS at 12:03

## 2025-03-11 NOTE — DISCHARGE INSTRUCTIONS
Take your lasix as prescribed, watch your sodium intake and make sure you go to your cardiology appointment. Return for worsening

## 2025-03-11 NOTE — ED NOTES
Pt urinated approx 1000ml of clear urine - pt reports he feels better and that getting up and down from the bed is already easier than before and he is feeling better

## 2025-03-11 NOTE — ED PROVIDER NOTES
Encounter Date: 3/10/2025       History     Chief Complaint   Patient presents with    Shortness of Breath     Pt c/o sob with exertion and bilateral leg and abdominal swelling x 1 week. Hx of CHF.     69-year-old male with a history of CHF and CKD presents ED for evaluation of shortness of breath.  He has been having worsening dyspnea on exertion and weight gain with fluid retention for past week.  He later incidentally noted that he ran out of his Lasix over a week ago and they would not renew the prescription so he assumed he did not need to take it anymore.  He denies any chest pain or needing to use his nitroglycerin.  He has also been having lower blood sugar than normal but no significant hypoglycemic episodes    The history is provided by the patient and a relative. No  was used.     Review of patient's allergies indicates:  No Known Allergies  Past Medical History:   Diagnosis Date    CKD (chronic kidney disease)      No past surgical history on file.  No family history on file.  Social History[1]  Review of Systems   Respiratory:  Positive for shortness of breath.    Cardiovascular:  Positive for leg swelling.       Physical Exam     Initial Vitals [03/10/25 1804]   BP Pulse Resp Temp SpO2   (!) 133/59 (!) 52 (!) 22 98.6 °F (37 °C) 98 %      MAP       --         Physical Exam    Nursing note and vitals reviewed.  Constitutional: He appears well-developed and well-nourished. He is not diaphoretic. No distress.   HENT:   Head: Normocephalic and atraumatic.   Nose: Nose normal. Mouth/Throat: Oropharynx is clear and moist.   Eyes: Conjunctivae and EOM are normal. Pupils are equal, round, and reactive to light.   Neck: Trachea normal. Neck supple.   Normal range of motion.  Cardiovascular:  Normal rate, regular rhythm, normal heart sounds and intact distal pulses.     Exam reveals no gallop and no friction rub.       No murmur heard.  Pulmonary/Chest: Breath sounds normal. No respiratory  distress. He has no wheezes. He has no rhonchi. He has no rales. He exhibits no tenderness.   Abdominal: Abdomen is soft. Bowel sounds are normal. He exhibits no distension and no mass. There is no abdominal tenderness. There is no rebound.   Musculoskeletal:         General: Edema present. No tenderness. Normal range of motion.      Cervical back: Normal range of motion and neck supple.      Lumbar back: Normal. No tenderness. Normal range of motion.     Neurological: He is alert and oriented to person, place, and time. He has normal strength. No cranial nerve deficit or sensory deficit.   Skin: Skin is warm and dry. Capillary refill takes less than 2 seconds. No rash and no abscess noted. No erythema. No pallor.   Psychiatric: He has a normal mood and affect. His behavior is normal. Judgment and thought content normal.         ED Course   Procedures  Labs Reviewed   COMPREHENSIVE METABOLIC PANEL - Abnormal       Result Value    Sodium 140      Potassium 4.7      Chloride 109 (*)     CO2 24      Glucose 184 (*)     Blood Urea Nitrogen 13.0      Creatinine 1.18      Calcium 8.6 (*)     Protein Total 6.2      Albumin 3.4      Globulin 2.8      Albumin/Globulin Ratio 1.2      Bilirubin Total 0.4      ALP 90      ALT 28      AST 19      eGFR >60      Anion Gap 7.0      BUN/Creatinine Ratio 11     B-TYPE NATRIURETIC PEPTIDE - Abnormal    Natriuretic Peptide 242.9 (*)    URINALYSIS, REFLEX TO URINE CULTURE - Abnormal    Color, UA Yellow      Appearance, UA Clear      Specific Gravity, UA 1.023      pH, UA 5.5      Protein, UA 1+ (*)     Glucose, UA Trace (*)     Ketones, UA Negative      Blood, UA Negative      Bilirubin, UA Negative      Urobilinogen, UA 2.0 (*)     Nitrites, UA Negative      Leukocyte Esterase, UA Negative      RBC, UA 0-5      WBC, UA 0-5      Bacteria, UA None Seen      Squamous Epithelial Cells, UA None Seen      Mucous, UA Trace (*)    CBC WITH DIFFERENTIAL - Abnormal    WBC 7.32      RBC 3.47 (*)      Hgb 10.6 (*)     Hct 33.2 (*)     MCV 95.7 (*)     MCH 30.5      MCHC 31.9 (*)     RDW 14.6      Platelet 179      MPV 10.2      Neut % 64.8      Lymph % 24.5      Mono % 6.1      Eos % 3.8      Basophil % 0.4      Imm Grans % 0.4      Neut # 4.74      Lymph # 1.79      Mono # 0.45      Eos # 0.28      Baso # 0.03      Imm Gran # 0.03      NRBC% 0.0     TROPONIN I - Normal    Troponin-I <0.010     TROPONIN I - Normal    Troponin-I <0.010     CBC W/ AUTO DIFFERENTIAL    Narrative:     The following orders were created for panel order CBC auto differential.  Procedure                               Abnormality         Status                     ---------                               -----------         ------                     CBC with Differential[[id number]]       Abnormal            Final result                 Please view results for these tests on the individual orders.     EKG Readings: (Independently Interpreted)   ED course       Imaging Results              X-Ray Chest 1 View (Final result)  Result time 03/10/25 18:47:36      Final result by Oz King MD (03/10/25 18:47:36)                   Impression:      No acute pulmonary process identified.      Electronically signed by: Oz King  Date:    03/10/2025  Time:    18:47               Narrative:    EXAMINATION:  XR CHEST 1 VIEW    CLINICAL HISTORY:  Shortness of breath    TECHNIQUE:  Frontal view(s) of the chest.    COMPARISON:  Radiography 02/06/2024    FINDINGS:  Previous sternotomy.  Heart size within normal limits for technique.  The lungs are well-inflated.  No consolidation identified.  Mild blunting of the left costophrenic angle similar to previous exam.  No pneumothorax appreciated.                                    X-Rays:   Independently Interpreted Readings:   Chest X-Ray: Normal heart size.  No infiltrates.  No acute abnormalities.     Medications   furosemide injection 80 mg (80 mg Intravenous Given 3/11/25 0049)     Medical  Decision Making  Given patient's presentation, differential diagnosis includes but is not limited to noncompliance, acs, chf, anemia, renal failure, pneumonia  To evaluate these  possible etiologies cbc, cmp, trop, bnp, EKG, cxr were ordered and reviewed  Workup consistent with chf exacerbation likely due to nonadherence to medications duet o running out of them. Iv diuresis, re-evaluate, represcribe. Has cardiology f/u next month    Problems Addressed:  Acute on chronic diastolic congestive heart failure: acute illness or injury that poses a threat to life or bodily functions  Benign essential HTN: chronic illness or injury  Dyspnea on exertion: acute illness or injury that poses a threat to life or bodily functions  Shortness of breath: acute illness or injury that poses a threat to life or bodily functions    Amount and/or Complexity of Data Reviewed  Independent Historian: caregiver  External Data Reviewed: notes.     Details: Admission last year for chf  Labs: ordered.  Radiology: ordered and independent interpretation performed.  ECG/medicine tests: ordered and independent interpretation performed.    Risk  OTC drugs.  Prescription drug management.               ED Course as of 03/11/25 0222   Tue Mar 11, 2025   0128 EKG obtained at 6:05 p.m. rate of 52 wide QRS rhythm with right bundle-branch block and left anterior fascicular block [BS]   0151 Has urinated a large amount and feels better with ambulating [BS]      ED Course User Index  [BS] Kizzy Bain MD                           Clinical Impression:  Final diagnoses:  [R06.02] Shortness of breath  [I50.33] Acute on chronic diastolic congestive heart failure (Primary)  [R06.09] Dyspnea on exertion  [I10] Benign essential HTN          ED Disposition Condition    Discharge Stable          ED Prescriptions       Medication Sig Dispense Start Date End Date Auth. Provider    furosemide (LASIX) 80 MG tablet Take 1 tablet (80 mg total) by mouth 2 (two)  times daily. 180 tablet 3/11/2025 6/9/2025 Kizzy Bain MD          Follow-up Information       Follow up With Specialties Details Why Contact Info    Anjali Craven NP Family Medicine Schedule an appointment as soon as possible for a visit   [address]   [place] [address]  [phone]      cardiologist  Schedule an appointment as soon as possible for a visit       [facility] Emergency Dept Emergency Medicine  As needed, If symptoms worsen [address] [facility] [address] [phone]                 [1]   Social History  Tobacco Use    Smoking status: Never    Smokeless tobacco: Never        Kizzy Bain MD  03/11/25 0222

## 2025-05-05 ENCOUNTER — OFFICE VISIT (OUTPATIENT)
Dept: NEUROLOGY | Facility: CLINIC | Age: 70
End: 2025-05-05
Payer: MEDICARE

## 2025-05-05 VITALS
DIASTOLIC BLOOD PRESSURE: 64 MMHG | BODY MASS INDEX: 43.95 KG/M2 | HEIGHT: 68 IN | WEIGHT: 290 LBS | SYSTOLIC BLOOD PRESSURE: 132 MMHG

## 2025-05-05 DIAGNOSIS — R41.3 OTHER AMNESIA: ICD-10-CM

## 2025-05-05 DIAGNOSIS — R41.89 IMPAIRED COGNITION: Primary | ICD-10-CM

## 2025-05-05 DIAGNOSIS — G47.33 OBSTRUCTIVE SLEEP APNEA: ICD-10-CM

## 2025-05-05 PROCEDURE — 3075F SYST BP GE 130 - 139MM HG: CPT | Mod: CPTII,S$GLB,, | Performed by: PSYCHIATRY & NEUROLOGY

## 2025-05-05 PROCEDURE — 1101F PT FALLS ASSESS-DOCD LE1/YR: CPT | Mod: CPTII,S$GLB,, | Performed by: PSYCHIATRY & NEUROLOGY

## 2025-05-05 PROCEDURE — 3288F FALL RISK ASSESSMENT DOCD: CPT | Mod: CPTII,S$GLB,, | Performed by: PSYCHIATRY & NEUROLOGY

## 2025-05-05 PROCEDURE — 1159F MED LIST DOCD IN RCRD: CPT | Mod: CPTII,S$GLB,, | Performed by: PSYCHIATRY & NEUROLOGY

## 2025-05-05 PROCEDURE — 3078F DIAST BP <80 MM HG: CPT | Mod: CPTII,S$GLB,, | Performed by: PSYCHIATRY & NEUROLOGY

## 2025-05-05 PROCEDURE — 3008F BODY MASS INDEX DOCD: CPT | Mod: CPTII,S$GLB,, | Performed by: PSYCHIATRY & NEUROLOGY

## 2025-05-05 PROCEDURE — 99205 OFFICE O/P NEW HI 60 MIN: CPT | Mod: S$GLB,,, | Performed by: PSYCHIATRY & NEUROLOGY

## 2025-05-05 PROCEDURE — 99999 PR PBB SHADOW E&M-EST. PATIENT-LVL III: CPT | Mod: PBBFAC,,, | Performed by: PSYCHIATRY & NEUROLOGY

## 2025-05-05 PROCEDURE — 1125F AMNT PAIN NOTED PAIN PRSNT: CPT | Mod: CPTII,S$GLB,, | Performed by: PSYCHIATRY & NEUROLOGY

## 2025-05-05 RX ORDER — NAPROXEN SODIUM 220 MG/1
81 TABLET, FILM COATED ORAL DAILY
COMMUNITY

## 2025-05-05 RX ORDER — LANOLIN ALCOHOL/MO/W.PET/CERES
1 CREAM (GRAM) TOPICAL 2 TIMES DAILY
COMMUNITY
Start: 2025-01-23

## 2025-05-05 NOTE — PROGRESS NOTES
Subjective     Patient ID: Terrence Woods is a 69 y.o. male.    Chief Complaint: cognitive impairement (New pt - cognitive impairment - referred by sandro stanley)    HPI  Longstanding memory loss  Not really progressive as far as he can tell  C/p CABG  Snores; freuqenet awakenings  Occ EDS  Diabetic  No hallucinations/falls  The memory issues do not really bother him  Independent with IADLs; pays his own bills  Son and family live with him  He rarely if ever drives    Review of Systems  The remainder of the 14 system ROS is noncontributory or negative unless mentioned/reviewed above.       Objective     Physical Exam  Mmse 25/30; normal spatial  Mental Status: Alert and oriented x3. Language is fluent with good comprehension.    Cranial Nerve: Pupils are equal, round, and reactive to light. Visual fields are intact to confrontation. Normal fundi. Ocular movements are intact. Face is symmetric at rest and with activation with intact sensation throughout. Hearing intact to finger rub bilaterally. Muscles of tongue and palate activate symmetrically. No dysarthria. Strength is full in sternocleidomastoid and trapezius bilaterally.    Motor: Muscle bulk and tone are normal. Strength is 5/5 in all four extremities both proximally and distally. Intact fine motor movements bilaterally. There is no pronator drift or satelliting on arm roll.    Sensory: Sensation is intact to light touch, pinprick, vibration, and proprioception throughout. Romberg is negative.    Reflexes: 2+ and symmetric at the biceps, triceps, brachioradialis, patella, and Achilles bilaterally. Plantar response is flexor bilaterally.    Coordination: No dysmetria on finger-nose-finger or heel-knee-shin. Normal rapid alternating movements. Fast finger tapping with normal amplitude and speed.    Gait: Narrow based with normal stride length and good arm swing bilaterally. Able to walk on heels, toes, and in tandem.     Mall 4  Neck 18.5  [place] scanned  in  Assessment and Plan     1. Impaired cognition  -     Home Sleep Study; Future    2. Obstructive sleep apnea  -     Home Sleep Study; Future    3. Other amnesia  -     Home Sleep Study; Future  -     MRI Brain Without Contrast; Future; Expected date: 05/05/2025        Mri   HST         Follow up in about 6 weeks (around 6/16/2025).

## 2025-05-12 ENCOUNTER — PROCEDURE VISIT (OUTPATIENT)
Dept: SLEEP MEDICINE | Facility: HOSPITAL | Age: 70
End: 2025-05-12
Attending: PSYCHIATRY & NEUROLOGY
Payer: MEDICARE

## 2025-05-12 DIAGNOSIS — G47.33 OBSTRUCTIVE SLEEP APNEA: ICD-10-CM

## 2025-05-12 DIAGNOSIS — R41.3 OTHER AMNESIA: ICD-10-CM

## 2025-05-12 DIAGNOSIS — R41.89 IMPAIRED COGNITION: ICD-10-CM

## 2025-05-12 PROCEDURE — 95806 SLEEP STUDY UNATT&RESP EFFT: CPT
